# Patient Record
Sex: FEMALE | Race: WHITE | NOT HISPANIC OR LATINO | Employment: OTHER | URBAN - METROPOLITAN AREA
[De-identification: names, ages, dates, MRNs, and addresses within clinical notes are randomized per-mention and may not be internally consistent; named-entity substitution may affect disease eponyms.]

---

## 2018-03-15 DIAGNOSIS — E22.1 HYPERPROLACTINEMIA (HCC): Primary | ICD-10-CM

## 2018-03-15 DIAGNOSIS — D49.7 PITUITARY TUMOR: ICD-10-CM

## 2019-01-14 ENCOUNTER — TELEPHONE (OUTPATIENT)
Dept: ENDOCRINOLOGY | Facility: HOSPITAL | Age: 54
End: 2019-01-14

## 2019-01-14 DIAGNOSIS — E22.1 HYPERPROLACTINEMIA (HCC): Primary | ICD-10-CM

## 2019-01-14 DIAGNOSIS — D49.7 PITUITARY TUMOR: ICD-10-CM

## 2019-01-14 NOTE — TELEPHONE ENCOUNTER
Patient requests new lab order  Previously ordered was: CBC, CMP, IGF1, PROLACTIN, FREE T4 AND TSH  DX Codes: E22 1 and D44 3  Okay to place orders   Orders need to be faxed to [phone], [email or web address]

## 2019-01-20 LAB
ALBUMIN SERPL-MCNC: 3.7 G/DL (ref 3.6–5.1)
ALBUMIN/GLOB SERPL: 1.3 (CALC) (ref 1–2.5)
ALP SERPL-CCNC: 81 U/L (ref 33–130)
ALT SERPL-CCNC: 18 U/L (ref 6–29)
AST SERPL-CCNC: 18 U/L (ref 10–35)
BASOPHILS # BLD AUTO: 59 CELLS/UL (ref 0–200)
BASOPHILS NFR BLD AUTO: 0.5 %
BILIRUB SERPL-MCNC: 0.3 MG/DL (ref 0.2–1.2)
BUN SERPL-MCNC: 13 MG/DL (ref 7–25)
BUN/CREAT SERPL: NORMAL (CALC) (ref 6–22)
CALCIUM SERPL-MCNC: 8.9 MG/DL (ref 8.6–10.4)
CHLORIDE SERPL-SCNC: 100 MMOL/L (ref 98–110)
CO2 SERPL-SCNC: 30 MMOL/L (ref 20–32)
CREAT SERPL-MCNC: 0.93 MG/DL (ref 0.5–1.05)
EOSINOPHIL # BLD AUTO: 94 CELLS/UL (ref 15–500)
EOSINOPHIL NFR BLD AUTO: 0.8 %
ERYTHROCYTE [DISTWIDTH] IN BLOOD BY AUTOMATED COUNT: 12.6 % (ref 11–15)
GLOBULIN SER CALC-MCNC: 2.8 G/DL (CALC) (ref 1.9–3.7)
GLUCOSE SERPL-MCNC: 115 MG/DL (ref 65–139)
HCT VFR BLD AUTO: 37.7 % (ref 35–45)
HGB BLD-MCNC: 12.4 G/DL (ref 11.7–15.5)
IGF-I SERPL-MCNC: 68 NG/ML (ref 50–317)
IGF-I Z-SCORE SERPL: -1.4 SD
LYMPHOCYTES # BLD AUTO: 1650 CELLS/UL (ref 850–3900)
LYMPHOCYTES NFR BLD AUTO: 14.1 %
MCH RBC QN AUTO: 29.5 PG (ref 27–33)
MCHC RBC AUTO-ENTMCNC: 32.9 G/DL (ref 32–36)
MCV RBC AUTO: 89.8 FL (ref 80–100)
MONOCYTES # BLD AUTO: 491 CELLS/UL (ref 200–950)
MONOCYTES NFR BLD AUTO: 4.2 %
NEUTROPHILS # BLD AUTO: 9407 CELLS/UL (ref 1500–7800)
NEUTROPHILS NFR BLD AUTO: 80.4 %
PLATELET # BLD AUTO: 239 THOUSAND/UL (ref 140–400)
PMV BLD REES-ECKER: 12 FL (ref 7.5–12.5)
POTASSIUM SERPL-SCNC: 4 MMOL/L (ref 3.5–5.3)
PROLACTIN SERPL-MCNC: 19 NG/ML
PROT SERPL-MCNC: 6.5 G/DL (ref 6.1–8.1)
RBC # BLD AUTO: 4.2 MILLION/UL (ref 3.8–5.1)
SL AMB EGFR AFRICAN AMERICAN: 82 ML/MIN/1.73M2
SL AMB EGFR NON AFRICAN AMERICAN: 71 ML/MIN/1.73M2
SODIUM SERPL-SCNC: 137 MMOL/L (ref 135–146)
T4 FREE SERPL-MCNC: 1 NG/DL (ref 0.8–1.8)
TSH SERPL-ACNC: 2.44 MIU/L
WBC # BLD AUTO: 11.7 THOUSAND/UL (ref 3.8–10.8)

## 2019-02-21 ENCOUNTER — OFFICE VISIT (OUTPATIENT)
Dept: ENDOCRINOLOGY | Facility: HOSPITAL | Age: 54
End: 2019-02-21
Payer: MEDICARE

## 2019-02-21 VITALS
DIASTOLIC BLOOD PRESSURE: 80 MMHG | HEIGHT: 62 IN | BODY MASS INDEX: 19.65 KG/M2 | HEART RATE: 80 BPM | WEIGHT: 106.8 LBS | SYSTOLIC BLOOD PRESSURE: 120 MMHG

## 2019-02-21 DIAGNOSIS — Z87.898 HISTORY OF PITUITARY TUMOR: ICD-10-CM

## 2019-02-21 DIAGNOSIS — E22.1 HYPERPROLACTINEMIA (HCC): Primary | ICD-10-CM

## 2019-02-21 PROCEDURE — 99214 OFFICE O/P EST MOD 30 MIN: CPT | Performed by: INTERNAL MEDICINE

## 2019-02-21 RX ORDER — SIMVASTATIN 40 MG
40 TABLET ORAL
COMMUNITY

## 2019-02-21 RX ORDER — MONTELUKAST SODIUM 10 MG/1
10 TABLET ORAL
COMMUNITY

## 2019-02-21 RX ORDER — MAG HYDROX/ALUMINUM HYD/SIMETH 400-400-40
SUSPENSION, ORAL (FINAL DOSE FORM) ORAL DAILY
COMMUNITY

## 2019-02-21 RX ORDER — FLUOXETINE HYDROCHLORIDE 40 MG/1
60 CAPSULE ORAL DAILY
COMMUNITY
End: 2022-03-29

## 2019-02-21 RX ORDER — LISINOPRIL 10 MG/1
10 TABLET ORAL DAILY
COMMUNITY

## 2019-02-21 RX ORDER — ALBUTEROL SULFATE 1.25 MG/3ML
1 SOLUTION RESPIRATORY (INHALATION) EVERY 6 HOURS PRN
COMMUNITY

## 2019-02-21 NOTE — PATIENT INSTRUCTIONS
Blood work is all normal    You do not need to restart the cabergoline at this point  We'll just keep an eye on the blood work  Ok to stop the Orthotricyclin  No MRI unless blood work gets abnormal   Follow up in 1 year with  Blood work

## 2019-02-21 NOTE — PROGRESS NOTES
2/21/2019    Assessment/Plan      Diagnoses and all orders for this visit:    Hyperprolactinemia ([facility] [address] )  -     TSH, 3rd generation Lab Collect; Future  -     T4, free Lab Collect; Future  -     Prolactin Lab Collect; Future  -     Comprehensive metabolic panel Lab Collect; Future  -     CBC and differential Lab Collect; Future  -     Insulin-like growth factor 1 (IGF-1) - Lab Collect; Future    History of pituitary tumor  -     TSH, 3rd generation Lab Collect; Future  -     T4, free Lab Collect; Future  -     Prolactin Lab Collect; Future  -     Comprehensive metabolic panel Lab Collect; Future  -     CBC and differential Lab Collect; Future  -     Insulin-like growth factor 1 (IGF-1) - Lab Collect; Future    Other orders  -     lisinopril (ZESTRIL) 10 mg tablet; Take 10 mg by mouth daily  -     Norgestim-Eth Estrad Triphasic (ORTHO TRI-CYCLEN, 28, PO); Take by mouth  -     simvastatin (ZOCOR) 40 mg tablet; Take 40 mg by mouth daily at bedtime  -     Cholecalciferol (VITAMIN D3) 5000 units CAPS; Take by mouth daily   -     montelukast (SINGULAIR) 10 mg tablet; Take 10 mg by mouth daily at bedtime  -     FLUoxetine (PROZAC) 40 MG capsule; Take 40 mg by mouth daily  -     metroNIDAZOLE (METROCREAM) 0 75 % cream; Apply topically 2 (two) times a day  -     carbamide peroxide (DEBROX) 6 5 % otic solution; 5 drops 2 (two) times a day  -     albuterol (ACCUNEB) 1 25 MG/3ML nebulizer solution; Take 1 ampule by nebulization every 6 (six) hours as needed for wheezing        Assessment/Plan:  1  Hyperprolactinemia  Her Cabergoline was discontinued in 2017 after 2 MRIs were negative for pituitary tumor and her prolactin had been controlled  Her most recent prolactin level off medication is normal   She is at risk for this prolactin elevate again, so observation is needed over time  I will follow her pituitary hormones on a yearly basis    Of note, gynecologist would like to discontinue her oral contraceptives, given her age and the fact that her prolactin level has normalized, I see no recent to continue oral contraceptives and I think she can have him discontinue to see if she is in menopause  2  Pituitary microadenoma  Her last 2 MRIs in 2017 and 2013 showed  no evidence of a pituitary tumor  At this point, no repeat MRI needs to be done unless her prolactin level increases significantly again  I will have her follow up in 1 year with preceding CMP, CBC, IGF-1 level, prolactin, TSH, and free T4       CC:  Prolactin and pituitary follow-up    History of Present Illness     HPI: Manjit Marie is a 48y o  year old female with history of hyperprolactinemia with pituitary microadenoma  She was found to have hyperprolactinemia in 2009 with a pituitary tumor microadenoma was noted in the workup on MRI  She had and MRI in 2017 with resolution of the pituitary tumor  Cabergoline was discontinued in 2017 to see if her prolactin remains normal off medication  She is here for follow-up  She denies galactorrhea  She has no nipple pain or discharge  She is on oral contraceptives and has a menstrual cycle on a regular basis  She denies heat or cold intolerance, diarrhea or constipation, palpitation, tremors, anxiety or depression, weight changes, or insomnia  She has some fatigue will take an afternoon nap  She denies dry skin, brittle nails, or hair loss  She has no orthostatics symptoms  She has no headaches or visual changes  She has no nausea or abdominal pain  Review of Systems   Constitutional: Positive for fatigue  Negative for unexpected weight change  Has some fatigue, takes a nap at times  4 lbs less than December 2017  HENT: Negative for hearing loss, tinnitus and trouble swallowing  Eyes: Negative for visual disturbance  Wears glasses  No diplopia  Respiratory: Negative for chest tightness and shortness of breath  Cardiovascular: Negative for chest pain, palpitations and leg swelling  Gastrointestinal: Negative for abdominal pain, constipation, diarrhea and nausea  Endocrine: Negative for cold intolerance and heat intolerance  Genitourinary:        Menses regular on a monthly basis on OCP  GYN would like to stop OCP  No galactorrhea  No breast pain  Musculoskeletal: Negative for arthralgias and back pain  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  She was in competitions last summer and 1 some ribbons again  Skin: Negative for wound  Neurological: Negative for dizziness, tremors, light-headedness, numbness and headaches  No orthostatic symptoms  Psychiatric/Behavioral: Negative for dysphoric mood and sleep disturbance  The patient is not nervous/anxious  Behaviors ok  Lives at [address]  Historical Information   Past Medical History:   Diagnosis Date    Anxiety     Asthma     symptoms when had a cat    Hyperlipidemia     Hypertension     Osteopenia     Rosacea      No past surgical history on file    Social History   Social History     Substance and Sexual Activity   Alcohol Use Never    Frequency: Never     Social History     Substance and Sexual Activity   Drug Use Never     Social History     Tobacco Use   Smoking Status Never Smoker   Smokeless Tobacco Never Used     Family History:   Family History   Problem Relation Age of Onset    Cancer Mother     Cancer Father     No Known Problems Sister        Meds/Allergies   Current Outpatient Medications   Medication Sig Dispense Refill    albuterol (ACCUNEB) 1 25 MG/3ML nebulizer solution Take 1 ampule by nebulization every 6 (six) hours as needed for wheezing      carbamide peroxide (DEBROX) 6 5 % otic solution 5 drops 2 (two) times a day      Cholecalciferol (VITAMIN D3) 5000 units CAPS Take by mouth daily       FLUoxetine (PROZAC) 40 MG capsule Take 40 mg by mouth daily      lisinopril (ZESTRIL) 10 mg tablet Take 10 mg by mouth daily      metroNIDAZOLE (METROCREAM) 0 75 % cream Apply topically 2 (two) times a day      montelukast (SINGULAIR) 10 mg tablet Take 10 mg by mouth daily at bedtime      Norgestim-Eth Estrad Triphasic (ORTHO TRI-CYCLEN, 28, PO) Take by mouth      simvastatin (ZOCOR) 40 mg tablet Take 40 mg by mouth daily at bedtime       No current facility-administered medications for this visit  Allergies   Allergen Reactions    Dust Mite Extract        Objective   Vitals: Blood pressure 120/80, pulse 80, height 5' 2" (1 575 m), weight 48 4 kg (106 lb 12 8 oz)  Invasive Devices          None          Physical Exam   Constitutional: She is oriented to person, place, and time  She appears well-developed and well-nourished  HENT:   Head: Normocephalic and atraumatic  No moon faces  Eyes: Pupils are equal, round, and reactive to light  Conjunctivae and EOM are normal    No lid lag, stare, proptosis, or periorbital edema  Neck: Normal range of motion  Neck supple  No thyromegaly present  Thyroid normal in size without palpable thyroid nodules  No bruits over the thyroid gland or carotids  No supraclavicular fat pads or buffalo hump  Cardiovascular: Normal rate, regular rhythm and normal heart sounds  No murmur heard  Pulmonary/Chest: Effort normal and breath sounds normal  She has no wheezes  Abdominal: Soft  There is no tenderness  No violaceous striae of the abdomen  Musculoskeletal: Normal range of motion  She exhibits no edema or deformity  No tremor of the outstretched hands  No spinous process tenderness  No CVA tenderness  Lymphadenopathy:     She has no cervical adenopathy  Neurological: She is alert and oriented to person, place, and time  She has normal reflexes  Deep tendon reflexes normal briskness  Skin: Skin is warm and dry  No rash noted  Vitals reviewed        The history was obtained from the review of the chart, [facility] endocrinology notes, and from the patient and aide from the group home     Lab Results:   Blood work done on 01/17/2019 demonstrates a prolactin of 19  IGF-1 level is 68   CBC demonstrates an elevated WBC count of 11 7 with an elevated neutrophil count but was otherwise normal   CMP demonstrates a glucose of 115 random but was otherwise normal       Lab Results   Component Value Date    BUN 13 01/17/2019    K 4 0 01/17/2019     01/17/2019    CO2 30 01/17/2019     Lab Results   Component Value Date    ALT 18 01/17/2019    AST 18 01/17/2019    ALKPHOS 81 01/17/2019       Lab Results   Component Value Date    TSH 2 44 01/17/2019    FREET4 1 0 01/17/2019         Future Appointments   Date Time Provider Marisol Abbott   2/25/2020  1:00 PM Monica Jimenes MD [facility]

## 2019-02-21 NOTE — LETTER
February 21, 2019     [facility] [address] [facility] [id number] [facility]    Patient: Drew Hall   YOB: 1965   Date of Visit: 2/21/2019       Dear Dr Trever Ledesma:    Thank you for referring Drew Hall to me for evaluation  Below are my notes for this consultation  If you have questions, please do not hesitate to call me  I look forward to following your patient along with you  Sincerely,        Orly Clements MD        CC: MD Orly Melgoza MD  2/21/2019  5:38 PM  Sign at close encounter  2/21/2019    Assessment/Plan      Diagnoses and all orders for this visit:    Hyperprolactinemia ([address] )  -     TSH, 3rd generation Lab Collect; Future  -     T4, free Lab Collect; Future  -     Prolactin Lab Collect; Future  -     Comprehensive metabolic panel Lab Collect; Future  -     CBC and differential Lab Collect; Future  -     Insulin-like growth factor 1 (IGF-1) - Lab Collect; Future    History of pituitary tumor  -     TSH, 3rd generation Lab Collect; Future  -     T4, free Lab Collect; Future  -     Prolactin Lab Collect; Future  -     Comprehensive metabolic panel Lab Collect; Future  -     CBC and differential Lab Collect; Future  -     Insulin-like growth factor 1 (IGF-1) - Lab Collect; Future    Other orders  -     lisinopril (ZESTRIL) 10 mg tablet; Take 10 mg by mouth daily  -     Norgestim-Eth Estrad Triphasic (ORTHO TRI-CYCLEN, 28, PO); Take by mouth  -     simvastatin (ZOCOR) 40 mg tablet; Take 40 mg by mouth daily at bedtime  -     Cholecalciferol (VITAMIN D3) 5000 units CAPS; Take by mouth daily   -     montelukast (SINGULAIR) 10 mg tablet; Take 10 mg by mouth daily at bedtime  -     FLUoxetine (PROZAC) 40 MG capsule; Take 40 mg by mouth daily  -     metroNIDAZOLE (METROCREAM) 0 75 % cream; Apply topically 2 (two) times a day  -     carbamide peroxide (DEBROX) 6 5 % otic solution; 5 drops 2 (two) times a day  -     albuterol (ACCUNEB) 1 25 MG/3ML nebulizer solution;  Take 1 ampule by nebulization every 6 (six) hours as needed for wheezing        Assessment/Plan:  1  Hyperprolactinemia  Her Cabergoline was discontinued in 2017 after 2 MRIs were negative for pituitary tumor and her prolactin had been controlled  Her most recent prolactin level off medication is normal   She is at risk for this prolactin elevate again, so observation is needed over time  I will follow her pituitary hormones on a yearly basis  Of note, gynecologist would like to discontinue her oral contraceptives, given her age and the fact that her prolactin level has normalized, I see no recent to continue oral contraceptives and I think she can have him discontinue to see if she is in menopause  2  Pituitary microadenoma  Her last 2 MRIs in 2017 and 2013 showed  no evidence of a pituitary tumor  At this point, no repeat MRI needs to be done unless her prolactin level increases significantly again  I will have her follow up in 1 year with preceding CMP, CBC, IGF-1 level, prolactin, TSH, and free T4       CC:  Prolactin and pituitary follow-up    History of Present Illness     HPI: Nicole Koch is a 48y o  year old female with history of hyperprolactinemia with pituitary microadenoma  She was found to have hyperprolactinemia in 2009 with a pituitary tumor microadenoma was noted in the workup on MRI  She had and MRI in 2017 with resolution of the pituitary tumor  Cabergoline was discontinued in 2017 to see if her prolactin remains normal off medication  She is here for follow-up  She denies galactorrhea  She has no nipple pain or discharge  She is on oral contraceptives and has a menstrual cycle on a regular basis  She denies heat or cold intolerance, diarrhea or constipation, palpitation, tremors, anxiety or depression, weight changes, or insomnia  She has some fatigue will take an afternoon nap  She denies dry skin, brittle nails, or hair loss  She has no orthostatics symptoms    She has no headaches or visual changes  She has no nausea or abdominal pain  Review of Systems   Constitutional: Positive for fatigue  Negative for unexpected weight change  Has some fatigue, takes a nap at times  4 lbs less than December 2017  HENT: Negative for hearing loss, tinnitus and trouble swallowing  Eyes: Negative for visual disturbance  Wears glasses  No diplopia  Respiratory: Negative for chest tightness and shortness of breath  Cardiovascular: Negative for chest pain, palpitations and leg swelling  Gastrointestinal: Negative for abdominal pain, constipation, diarrhea and nausea  Endocrine: Negative for cold intolerance and heat intolerance  Genitourinary:        Menses regular on a monthly basis on OCP  GYN would like to stop OCP  No galactorrhea  No breast pain  Musculoskeletal: Negative for arthralgias and back pain  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  She was in competitions last summer and 1 some ribbons again  Skin: Negative for wound  Neurological: Negative for dizziness, tremors, light-headedness, numbness and headaches  No orthostatic symptoms  Psychiatric/Behavioral: Negative for dysphoric mood and sleep disturbance  The patient is not nervous/anxious  Behaviors ok  Lives at [address]  Historical Information   Past Medical History:   Diagnosis Date    Anxiety     Asthma     symptoms when had a cat    Hyperlipidemia     Hypertension     Osteopenia     Rosacea      No past surgical history on file    Social History   Social History     Substance and Sexual Activity   Alcohol Use Never    Frequency: Never     Social History     Substance and Sexual Activity   Drug Use Never     Social History     Tobacco Use   Smoking Status Never Smoker   Smokeless Tobacco Never Used     Family History:   Family History   Problem Relation Age of Onset    Cancer Mother     Cancer Father     No Known Problems Sister Meds/Allergies   Current Outpatient Medications   Medication Sig Dispense Refill    albuterol (ACCUNEB) 1 25 MG/3ML nebulizer solution Take 1 ampule by nebulization every 6 (six) hours as needed for wheezing      carbamide peroxide (DEBROX) 6 5 % otic solution 5 drops 2 (two) times a day      Cholecalciferol (VITAMIN D3) 5000 units CAPS Take by mouth daily       FLUoxetine (PROZAC) 40 MG capsule Take 40 mg by mouth daily      lisinopril (ZESTRIL) 10 mg tablet Take 10 mg by mouth daily      metroNIDAZOLE (METROCREAM) 0 75 % cream Apply topically 2 (two) times a day      montelukast (SINGULAIR) 10 mg tablet Take 10 mg by mouth daily at bedtime      Norgestim-Eth Estrad Triphasic (ORTHO TRI-CYCLEN, 28, PO) Take by mouth      simvastatin (ZOCOR) 40 mg tablet Take 40 mg by mouth daily at bedtime       No current facility-administered medications for this visit  Allergies   Allergen Reactions    Dust Mite Extract        Objective   Vitals: Blood pressure 120/80, pulse 80, height 5' 2" (1 575 m), weight 48 4 kg (106 lb 12 8 oz)  Invasive Devices          None          Physical Exam   Constitutional: She is oriented to person, place, and time  She appears well-developed and well-nourished  HENT:   Head: Normocephalic and atraumatic  No moon faces  Eyes: Pupils are equal, round, and reactive to light  Conjunctivae and EOM are normal    No lid lag, stare, proptosis, or periorbital edema  Neck: Normal range of motion  Neck supple  No thyromegaly present  Thyroid normal in size without palpable thyroid nodules  No bruits over the thyroid gland or carotids  No supraclavicular fat pads or buffalo hump  Cardiovascular: Normal rate, regular rhythm and normal heart sounds  No murmur heard  Pulmonary/Chest: Effort normal and breath sounds normal  She has no wheezes  Abdominal: Soft  There is no tenderness  No violaceous striae of the abdomen  Musculoskeletal: Normal range of motion   She exhibits no edema or deformity  No tremor of the outstretched hands  No spinous process tenderness  No CVA tenderness  Lymphadenopathy:     She has no cervical adenopathy  Neurological: She is alert and oriented to person, place, and time  She has normal reflexes  Deep tendon reflexes normal briskness  Skin: Skin is warm and dry  No rash noted  Vitals reviewed  The history was obtained from the review of the chart, [facility] endocrinology notes, and from the patient and aide from the group home  Lab Results:   Blood work done on 01/17/2019 demonstrates a prolactin of 19  IGF-1 level is 68   CBC demonstrates an elevated WBC count of 11 7 with an elevated neutrophil count but was otherwise normal   CMP demonstrates a glucose of 115 random but was otherwise normal       Lab Results   Component Value Date    BUN 13 01/17/2019    K 4 0 01/17/2019     01/17/2019    CO2 30 01/17/2019     Lab Results   Component Value Date    ALT 18 01/17/2019    AST 18 01/17/2019    ALKPHOS 81 01/17/2019       Lab Results   Component Value Date    TSH 2 44 01/17/2019    FREET4 1 0 01/17/2019         Future Appointments   Date Time Provider Marisol Abbott   2/25/2020  1:00 PM Tony Mohan MD [facility]

## 2020-02-06 LAB
ALBUMIN SERPL-MCNC: 4 G/DL (ref 3.6–5.1)
ALBUMIN/GLOB SERPL: 1.4 (CALC) (ref 1–2.5)
ALP SERPL-CCNC: 109 U/L (ref 37–153)
ALT SERPL-CCNC: 13 U/L (ref 6–29)
AST SERPL-CCNC: 19 U/L (ref 10–35)
BASOPHILS # BLD AUTO: 84 CELLS/UL (ref 0–200)
BASOPHILS NFR BLD AUTO: 0.9 %
BILIRUB SERPL-MCNC: 0.4 MG/DL (ref 0.2–1.2)
BUN SERPL-MCNC: 15 MG/DL (ref 7–25)
BUN/CREAT SERPL: NORMAL (CALC) (ref 6–22)
CALCIUM SERPL-MCNC: 9.6 MG/DL (ref 8.6–10.4)
CHLORIDE SERPL-SCNC: 102 MMOL/L (ref 98–110)
CO2 SERPL-SCNC: 30 MMOL/L (ref 20–32)
CREAT SERPL-MCNC: 0.99 MG/DL (ref 0.5–1.05)
EOSINOPHIL # BLD AUTO: 149 CELLS/UL (ref 15–500)
EOSINOPHIL NFR BLD AUTO: 1.6 %
ERYTHROCYTE [DISTWIDTH] IN BLOOD BY AUTOMATED COUNT: 12.2 % (ref 11–15)
GLOBULIN SER CALC-MCNC: 2.8 G/DL (CALC) (ref 1.9–3.7)
GLUCOSE SERPL-MCNC: 77 MG/DL (ref 65–99)
HCT VFR BLD AUTO: 42.6 % (ref 35–45)
HGB BLD-MCNC: 13.8 G/DL (ref 11.7–15.5)
IGF-I SERPL-MCNC: 83 NG/ML (ref 50–317)
IGF-I Z-SCORE SERPL: -1.1 SD
LYMPHOCYTES # BLD AUTO: 1590 CELLS/UL (ref 850–3900)
LYMPHOCYTES NFR BLD AUTO: 17.1 %
MCH RBC QN AUTO: 29.1 PG (ref 27–33)
MCHC RBC AUTO-ENTMCNC: 32.4 G/DL (ref 32–36)
MCV RBC AUTO: 89.9 FL (ref 80–100)
MONOCYTES # BLD AUTO: 521 CELLS/UL (ref 200–950)
MONOCYTES NFR BLD AUTO: 5.6 %
NEUTROPHILS # BLD AUTO: 6956 CELLS/UL (ref 1500–7800)
NEUTROPHILS NFR BLD AUTO: 74.8 %
PLATELET # BLD AUTO: 262 THOUSAND/UL (ref 140–400)
PMV BLD REES-ECKER: 11.7 FL (ref 7.5–12.5)
POTASSIUM SERPL-SCNC: 4.2 MMOL/L (ref 3.5–5.3)
PROLACTIN SERPL-MCNC: 12.6 NG/ML
PROT SERPL-MCNC: 6.8 G/DL (ref 6.1–8.1)
RBC # BLD AUTO: 4.74 MILLION/UL (ref 3.8–5.1)
SL AMB EGFR AFRICAN AMERICAN: 75 ML/MIN/1.73M2
SL AMB EGFR NON AFRICAN AMERICAN: 65 ML/MIN/1.73M2
SODIUM SERPL-SCNC: 139 MMOL/L (ref 135–146)
T4 FREE SERPL-MCNC: 1.2 NG/DL (ref 0.8–1.8)
TSH SERPL-ACNC: 2.01 MIU/L
WBC # BLD AUTO: 9.3 THOUSAND/UL (ref 3.8–10.8)

## 2020-03-09 ENCOUNTER — OFFICE VISIT (OUTPATIENT)
Dept: ENDOCRINOLOGY | Facility: HOSPITAL | Age: 55
End: 2020-03-09
Payer: MEDICARE

## 2020-03-09 VITALS
BODY MASS INDEX: 21.36 KG/M2 | HEART RATE: 84 BPM | WEIGHT: 108.8 LBS | HEIGHT: 60 IN | DIASTOLIC BLOOD PRESSURE: 68 MMHG | SYSTOLIC BLOOD PRESSURE: 118 MMHG

## 2020-03-09 DIAGNOSIS — Z87.898 HISTORY OF PITUITARY TUMOR: ICD-10-CM

## 2020-03-09 DIAGNOSIS — E22.1 HYPERPROLACTINEMIA (HCC): Primary | ICD-10-CM

## 2020-03-09 PROCEDURE — 99214 OFFICE O/P EST MOD 30 MIN: CPT | Performed by: INTERNAL MEDICINE

## 2020-03-09 NOTE — PATIENT INSTRUCTIONS
The blood work is normal    We'll continue to follow levels over time  Follow up in 1 year with blood work

## 2020-03-09 NOTE — PROGRESS NOTES
3/10/2020    Assessment/Plan      Diagnoses and all orders for this visit:    Hyperprolactinemia ([facility] [address] )  -     TSH, 3rd generation Lab Collect; Future  -     T4, free Lab Collect; Future  -     Prolactin Lab Collect; Future  -     Comprehensive metabolic panel Lab Collect; Future  -     CBC and differential Lab Collect; Future  -     Insulin-like growth factor 1 (IGF-1) - Lab Collect; Future    History of pituitary tumor  -     TSH, 3rd generation Lab Collect; Future  -     T4, free Lab Collect; Future  -     Prolactin Lab Collect; Future  -     Comprehensive metabolic panel Lab Collect; Future  -     CBC and differential Lab Collect; Future  -     Insulin-like growth factor 1 (IGF-1) - Lab Collect; Future        Assessment/Plan:  1  Hyperprolactinemia  Prolactin levels have been normal over the last several years  Cabergoline was discontinued in 2017 and at this point does not need to be restarted as her prolactin level is normal   Her oral contraceptives were able to be discontinued since those were only started due to the hyperprolactinemia  She is still having intermittent menses even though she is 50    2  History of pituitary tumor  Last MRI in 2017 showed resolution of her pituitary tumor  Unless her prolactin elevates again, no repeat MRI needs to be done  The rest of her pituitary function blood work have been normal     I have asked her to follow up in 1 year with preceding CMP, CBC, prolactin, IGF-1 level, TSH, and free T4       CC:  Prolactin and pituitary follow-up    History of Present Illness     HPI: Dolores Abrams is a 50y o  year old female with history of hyperprolactinemia with pituitary microadenoma here for follow-up  She was found to have hyperprolactinemia in 2009 and a pituitary microadenoma was noted on the workup in an MRI  A follow-up MRI in 2017 showed resolution of the pituitary tumor    She had been on cabergoline but that was discontinued in 2017 to see if the prolactin remained normal off medication  Her prolactin has been controlled ever since and she is here for her yearly follow-up  She denies galactorrhea  Menstrual cycles do occur intermittently  Her last menses was just last week  Oral contraceptives were discontinued by her gynecologist   She denies orthostatics symptoms or nausea  She has no headaches or visual changes  She has no loss of peripheral vision  She denies heat or cold intolerance, tremors, palpitation, fatigue, or weight changes  She denies diarrhea or constipation, depression, insomnia  She has had some increase in anxiety and fear of leaving home in general   Medications have needed to be adjusted by her psychiatrist       Review of Systems   Constitutional: Negative for fatigue and unexpected weight change  HENT: Negative for trouble swallowing  Eyes: Negative for visual disturbance  No diplopia  No peripheral vision loss  Respiratory: Negative for chest tightness and shortness of breath  Cardiovascular: Negative for chest pain and palpitations  Gastrointestinal: Negative for constipation, diarrhea and nausea  Endocrine: Negative for cold intolerance and heat intolerance  Genitourinary:        Off OCP now per gyn as of age for menopause  Had menses last week  Menses irregular  No galactorrhea  Skin: Negative for rash  Neurological: Negative for dizziness, tremors, light-headedness and headaches  Psychiatric/Behavioral: Negative for dysphoric mood and sleep disturbance  The patient is nervous/anxious  Increase in Prozac has not helped the fear of leaving the home  Historical Information   Past Medical History:   Diagnosis Date    Anxiety     Asthma     symptoms when had a cat    Hyperlipidemia     Hypertension     Osteopenia     Rosacea      History reviewed  No pertinent surgical history    Social History   Social History     Substance and Sexual Activity   Alcohol Use Never    Frequency: Never     Social History     Substance and Sexual Activity   Drug Use Never     Social History     Tobacco Use   Smoking Status Never Smoker   Smokeless Tobacco Never Used     Family History:   Family History   Problem Relation Age of Onset    Cancer Mother     Cancer Father     No Known Problems Sister        Meds/Allergies   Current Outpatient Medications   Medication Sig Dispense Refill    albuterol (ACCUNEB) 1 25 MG/3ML nebulizer solution Take 1 ampule by nebulization every 6 (six) hours as needed for wheezing      carbamide peroxide (DEBROX) 6 5 % otic solution 5 drops 2 (two) times a day      Cholecalciferol (VITAMIN D3) 5000 units CAPS Take by mouth daily       FLUoxetine (PROZAC) 40 MG capsule Take 55 mg by mouth daily       lisinopril (ZESTRIL) 10 mg tablet Take 10 mg by mouth daily      metroNIDAZOLE (METROCREAM) 0 75 % cream Apply topically 2 (two) times a day      montelukast (SINGULAIR) 10 mg tablet Take 10 mg by mouth daily at bedtime      simvastatin (ZOCOR) 40 mg tablet Take 40 mg by mouth daily at bedtime       No current facility-administered medications for this visit  Allergies   Allergen Reactions    Dust Mite Extract        Objective   Vitals: Blood pressure 118/68, pulse 84, height 5' (1 524 m), weight 49 4 kg (108 lb 12 8 oz)  Invasive Devices     None                 Physical Exam   Constitutional: She is oriented to person, place, and time  She appears well-developed and well-nourished  HENT:   Head: Normocephalic and atraumatic  No moon faces  Eyes: Pupils are equal, round, and reactive to light  Conjunctivae and EOM are normal    No lid lag, stare, proptosis, or periorbital edema  Neck: Normal range of motion  Neck supple  No thyromegaly present  Thyroid normal in size without palpable thyroid nodules  No carotid bruits  No supraclavicular fat pad or buffalo hump  Cardiovascular: Normal rate, regular rhythm and normal heart sounds  No murmur heard    Pulmonary/Chest: Effort normal and breath sounds normal  She has no wheezes  Abdominal: Soft  Musculoskeletal: She exhibits no edema or deformity  No tremor of the outstretched hands  Lymphadenopathy:     She has no cervical adenopathy  Neurological: She is alert and oriented to person, place, and time  She has normal reflexes  Deep tendon reflexes normal    Skin: Skin is warm and dry  No rash noted  Vitals reviewed  The history was obtained from the review of the chart and from the patient  Lab Results:    Blood work done on 02/03/2020 showed a TSH of 2 01 with a free T4 of 1 2  CMP showed a glucose of 77, sodium 139, but was otherwise normal   IGF-1 level is 83  CBC is normal   Prolactin is 12 6        Lab Results   Component Value Date    CREATININE 0 99 02/03/2020    CREATININE 0 93 01/17/2019    BUN 15 02/03/2020    K 4 2 02/03/2020     02/03/2020    CO2 30 02/03/2020     Lab Results   Component Value Date    ALT 13 02/03/2020    AST 19 02/03/2020    ALKPHOS 109 02/03/2020     Lab Results   Component Value Date    TSH 2 01 02/03/2020    FREET4 1 2 02/03/2020       Future Appointments   Date Time Provider Marisol Abbott   3/15/2021  9:20 AM Jeff Quintero MD [facility]

## 2021-02-11 LAB
ALBUMIN SERPL-MCNC: 4.1 G/DL (ref 3.6–5.1)
ALBUMIN/GLOB SERPL: 1.5 (CALC) (ref 1–2.5)
ALP SERPL-CCNC: 107 U/L (ref 37–153)
ALT SERPL-CCNC: 16 U/L (ref 6–29)
AST SERPL-CCNC: 21 U/L (ref 10–35)
BASOPHILS # BLD AUTO: 58 CELLS/UL (ref 0–200)
BASOPHILS NFR BLD AUTO: 0.7 %
BILIRUB SERPL-MCNC: 0.4 MG/DL (ref 0.2–1.2)
BUN SERPL-MCNC: 16 MG/DL (ref 7–25)
BUN/CREAT SERPL: NORMAL (CALC) (ref 6–22)
CALCIUM SERPL-MCNC: 9.5 MG/DL (ref 8.6–10.4)
CHLORIDE SERPL-SCNC: 103 MMOL/L (ref 98–110)
CO2 SERPL-SCNC: 29 MMOL/L (ref 20–32)
CREAT SERPL-MCNC: 0.96 MG/DL (ref 0.5–1.05)
EOSINOPHIL # BLD AUTO: 108 CELLS/UL (ref 15–500)
EOSINOPHIL NFR BLD AUTO: 1.3 %
ERYTHROCYTE [DISTWIDTH] IN BLOOD BY AUTOMATED COUNT: 12.7 % (ref 11–15)
GLOBULIN SER CALC-MCNC: 2.8 G/DL (CALC) (ref 1.9–3.7)
GLUCOSE SERPL-MCNC: 81 MG/DL (ref 65–99)
HCT VFR BLD AUTO: 43.1 % (ref 35–45)
HGB BLD-MCNC: 13.8 G/DL (ref 11.7–15.5)
IGF-I SERPL-MCNC: 95 NG/ML (ref 50–317)
IGF-I Z-SCORE SERPL: -0.8 SD
LYMPHOCYTES # BLD AUTO: 1469 CELLS/UL (ref 850–3900)
LYMPHOCYTES NFR BLD AUTO: 17.7 %
MCH RBC QN AUTO: 28.8 PG (ref 27–33)
MCHC RBC AUTO-ENTMCNC: 32 G/DL (ref 32–36)
MCV RBC AUTO: 90 FL (ref 80–100)
MONOCYTES # BLD AUTO: 531 CELLS/UL (ref 200–950)
MONOCYTES NFR BLD AUTO: 6.4 %
NEUTROPHILS # BLD AUTO: 6134 CELLS/UL (ref 1500–7800)
NEUTROPHILS NFR BLD AUTO: 73.9 %
PLATELET # BLD AUTO: 208 THOUSAND/UL (ref 140–400)
PMV BLD REES-ECKER: 12.1 FL (ref 7.5–12.5)
POTASSIUM SERPL-SCNC: 3.9 MMOL/L (ref 3.5–5.3)
PROT SERPL-MCNC: 6.9 G/DL (ref 6.1–8.1)
RBC # BLD AUTO: 4.79 MILLION/UL (ref 3.8–5.1)
SL AMB EGFR AFRICAN AMERICAN: 78 ML/MIN/1.73M2
SL AMB EGFR NON AFRICAN AMERICAN: 67 ML/MIN/1.73M2
SODIUM SERPL-SCNC: 139 MMOL/L (ref 135–146)
T4 FREE SERPL-MCNC: 1.1 NG/DL (ref 0.8–1.8)
TSH SERPL-ACNC: 1.2 MIU/L
WBC # BLD AUTO: 8.3 THOUSAND/UL (ref 3.8–10.8)

## 2021-03-15 ENCOUNTER — OFFICE VISIT (OUTPATIENT)
Dept: ENDOCRINOLOGY | Facility: HOSPITAL | Age: 56
End: 2021-03-15
Payer: MEDICARE

## 2021-03-15 VITALS
HEIGHT: 60 IN | SYSTOLIC BLOOD PRESSURE: 124 MMHG | BODY MASS INDEX: 23.05 KG/M2 | DIASTOLIC BLOOD PRESSURE: 80 MMHG | WEIGHT: 117.4 LBS | HEART RATE: 82 BPM

## 2021-03-15 DIAGNOSIS — Z87.898 HISTORY OF PITUITARY TUMOR: ICD-10-CM

## 2021-03-15 DIAGNOSIS — E22.1 HYPERPROLACTINEMIA (HCC): Primary | ICD-10-CM

## 2021-03-15 PROCEDURE — 99213 OFFICE O/P EST LOW 20 MIN: CPT | Performed by: INTERNAL MEDICINE

## 2021-03-15 RX ORDER — BUDESONIDE 0.5 MG/2ML
0.5 INHALANT ORAL 2 TIMES DAILY
COMMUNITY
End: 2022-03-14 | Stop reason: ALTCHOICE

## 2021-03-15 NOTE — PATIENT INSTRUCTIONS
The blood work is normal    the prolactin was not done  Let's get prolactin done now  Follow up in 1 year with blood work

## 2021-03-15 NOTE — PROGRESS NOTES
3/15/2021    Assessment/Plan      Diagnoses and all orders for this visit:    Hyperprolactinemia ([facility] [address] )  -     Prolactin Lab Collect  -     CBC and differential Lab Collect; Future  -     Comprehensive metabolic panel Lab Collect; Future  -     Prolactin Lab Collect; Future  -     TSH, 3rd generation Lab Collect; Future  -     T4, free Lab Collect; Future  -     Insulin-like growth factor 1 (IGF-1) - Lab Collect; Future    History of pituitary tumor  -     Prolactin Lab Collect  -     CBC and differential Lab Collect; Future  -     Comprehensive metabolic panel Lab Collect; Future  -     Prolactin Lab Collect; Future  -     TSH, 3rd generation Lab Collect; Future  -     T4, free Lab Collect; Future  -     Insulin-like growth factor 1 (IGF-1) - Lab Collect; Future    Other orders  -     budesonide (PULMICORT) 0 5 mg/2 mL nebulizer solution; Take 0 5 mg by nebulization 2 (two) times a day Rinse mouth after use  Assessment/Plan:  1  Hyperprolactinemia  Unfortunately, the lab did not do a prolactin level  She has no symptoms of hyperprolactinemia  I have asked her to get a prolactin level done at her earliest convenience  No medications are needed at this point  2  History of pituitary tumor  Last MRI did show resolution of her pituitary tumor  She has no headaches or orthostatics symptoms so no repeat MRI needs to be performed at this point  I have asked her to get a prolactin done at her earliest convenience  I have asked her to follow up in 1 year with preceding prolactin, TSH, free T4, IGF-1 level, CMP, and CBC  CC: Hyperprolactinemia and pituitary follow-up    History of Present Illness     HPI: Janiya Garcia is a 49y o  year old female with history of Hyperprolactinemia with to her lispro adenoma here for follow-up  She was found to have hyperprolactinemia in 2009 and pituitary microadenoma was noted on the workup on MRI    Follow-up MRI in 2017 showed resolution of the pituitary tumor   She was on cabergoline to control her prolactin which was discontinued in 2017 and the prolactin has remained normal since then  She is always somewhat cold  She has gained 9 lb more than last year  She denies heat intolerance, palpitation, tremors, or fatigue  She denies diarrhea or constipation, anxiety or depression, or insomnia  She denies dry skin, brittle nails, or hair loss  Menstrual cycles are occurring more regularly  She has seen a gynecologist   She has no galactorrhea  She has no headaches or visual changes  She has no orthostatics symptoms  Review of Systems   Constitutional: Positive for unexpected weight change  Negative for fatigue  Weight 9 lb more than March 2020  HENT: Negative for trouble swallowing  Eyes: Negative for visual disturbance  Respiratory: Negative for chest tightness and shortness of breath  Cardiovascular: Negative for chest pain and palpitations  Gastrointestinal: Negative for abdominal pain, constipation, diarrhea and nausea  Endocrine: Positive for cold intolerance  Negative for heat intolerance  Genitourinary:        Menses more irregular  Was to Gyn recently  No galactorrhea  Skin: Negative for rash  No dry skin  No brittle nails  No hair loss  Neurological: Negative for dizziness, tremors, light-headedness and headaches  No orthostatic symptoms  Psychiatric/Behavioral: Negative for dysphoric mood and sleep disturbance  The patient is not nervous/anxious  Behavior is stable  Was able to ride horses recently after COVID vaccines  Historical Information   Past Medical History:   Diagnosis Date    Anxiety     Asthma     symptoms when had a cat    Hyperlipidemia     Hypertension     Osteopenia     Rosacea      No past surgical history on file    Social History   Social History     Substance and Sexual Activity   Alcohol Use Never    Frequency: Never     Social History     Substance and Sexual Activity   Drug Use Never     Social History     Tobacco Use   Smoking Status Never Smoker   Smokeless Tobacco Never Used     Family History:   Family History   Problem Relation Age of Onset    Cancer Mother     Cancer Father     No Known Problems Sister        Meds/Allergies   Current Outpatient Medications   Medication Sig Dispense Refill    albuterol (ACCUNEB) 1 25 MG/3ML nebulizer solution Take 1 ampule by nebulization every 6 (six) hours as needed for wheezing      budesonide (PULMICORT) 0 5 mg/2 mL nebulizer solution Take 0 5 mg by nebulization 2 (two) times a day Rinse mouth after use   carbamide peroxide (DEBROX) 6 5 % otic solution 5 drops 2 (two) times a day      Cholecalciferol (VITAMIN D3) 5000 units CAPS Take by mouth daily       FLUoxetine (PROZAC) 40 MG capsule Take 60 mg by mouth daily       lisinopril (ZESTRIL) 10 mg tablet Take 10 mg by mouth daily      metroNIDAZOLE (METROCREAM) 0 75 % cream Apply topically 2 (two) times a day      montelukast (SINGULAIR) 10 mg tablet Take 10 mg by mouth daily at bedtime      simvastatin (ZOCOR) 40 mg tablet Take 40 mg by mouth daily at bedtime       No current facility-administered medications for this visit  Allergies   Allergen Reactions    Dust Mite Extract        Objective   Vitals: Blood pressure 124/80, pulse 82, height 5' (1 524 m), weight 53 3 kg (117 lb 6 4 oz)  Invasive Devices     None                 Physical Exam  Vitals signs reviewed  Constitutional:       Appearance: Normal appearance  She is well-developed and normal weight  HENT:      Head: Normocephalic and atraumatic  Eyes:      Extraocular Movements: Extraocular movements intact  Conjunctiva/sclera: Conjunctivae normal       Comments: No lid lag, stare, proptosis, or periorbital edema  Neck:      Musculoskeletal: Normal range of motion and neck supple  Thyroid: No thyromegaly  Vascular: No carotid bruit  Comments: Thyroid normal in size    No palpable thyroid nodules  No supraclavicular fat pads or buffalo hump  Cardiovascular:      Rate and Rhythm: Normal rate and regular rhythm  Heart sounds: Normal heart sounds  No murmur  Pulmonary:      Effort: Pulmonary effort is normal       Breath sounds: Normal breath sounds  No wheezing  Abdominal:      Palpations: Abdomen is soft  Musculoskeletal: Normal range of motion  General: No deformity  Right lower leg: No edema  Left lower leg: No edema  Comments: No tremor of the outstretched hands  Lymphadenopathy:      Cervical: No cervical adenopathy  Skin:     General: Skin is warm and dry  Findings: No rash  Neurological:      Mental Status: She is alert and oriented to person, place, and time  Deep Tendon Reflexes: Reflexes are normal and symmetric  Comments: Deep tendon reflexes normal          The history was obtained from the review of the chart and from the patient and group home staff member  Lab Results:     Blood work done on 02/08/2021 showed a CMP with a glucose of 81, sodium 139 with a potassium of 3 9 but was otherwise normal     Lab Results   Component Value Date    CREATININE 0 96 02/08/2021    CREATININE 0 99 02/03/2020    CREATININE 0 93 01/17/2019    BUN 16 02/08/2021    K 3 9 02/08/2021     02/08/2021    CO2 29 02/08/2021       Lab Results   Component Value Date    ALT 16 02/08/2021    AST 21 02/08/2021    ALKPHOS 107 02/08/2021     Lab Results   Component Value Date    TSH 1 20 02/08/2021    FREET4 1 1 02/08/2021       IGF-1 level is 91        CBC is normal     Future Appointments   Date Time Provider Marisol Abbott   3/14/2022  9:40 AM Tonya Salazar, [address] [facility]

## 2021-03-20 LAB — PROLACTIN SERPL-MCNC: 22.8 NG/ML

## 2021-03-22 ENCOUNTER — TELEPHONE (OUTPATIENT)
Dept: ENDOCRINOLOGY | Facility: HOSPITAL | Age: 56
End: 2021-03-22

## 2021-03-22 NOTE — TELEPHONE ENCOUNTER
Caregiver called for lab results  She would also like them faxed to [phone] once they are reviewed

## 2021-07-05 PROBLEM — R92.30 DENSE BREAST TISSUE ON MAMMOGRAM: Status: ACTIVE | Noted: 2021-07-05

## 2021-07-05 PROBLEM — R92.2 DENSE BREAST TISSUE ON MAMMOGRAM: Status: ACTIVE | Noted: 2021-07-05

## 2022-01-27 LAB
IGF-I SERPL-MCNC: 77 NG/ML (ref 50–317)
IGF-I Z-SCORE SERPL: -1.2 SD
PROLACTIN SERPL-MCNC: 8.5 NG/ML

## 2022-03-14 ENCOUNTER — OFFICE VISIT (OUTPATIENT)
Dept: ENDOCRINOLOGY | Facility: HOSPITAL | Age: 57
End: 2022-03-14
Payer: MEDICARE

## 2022-03-14 VITALS
HEART RATE: 82 BPM | SYSTOLIC BLOOD PRESSURE: 140 MMHG | WEIGHT: 116 LBS | DIASTOLIC BLOOD PRESSURE: 80 MMHG | HEIGHT: 60 IN | BODY MASS INDEX: 22.78 KG/M2

## 2022-03-14 DIAGNOSIS — E22.1 HYPERPROLACTINEMIA (HCC): Primary | ICD-10-CM

## 2022-03-14 DIAGNOSIS — Z87.898 HISTORY OF PITUITARY TUMOR: ICD-10-CM

## 2022-03-14 PROCEDURE — 99214 OFFICE O/P EST MOD 30 MIN: CPT | Performed by: INTERNAL MEDICINE

## 2022-03-14 RX ORDER — METRONIDAZOLE 10 MG/G
GEL TOPICAL
COMMUNITY

## 2022-03-14 NOTE — PROGRESS NOTES
3/14/2022    Assessment/Plan      Diagnoses and all orders for this visit:    Hyperprolactinemia ([address] )  -     Comprehensive metabolic panel Lab Collect; Future  -     CBC and differential Lab Collect; Future  -     Insulin-like growth factor 1 (IGF-1) Lab Collect; Future  -     Prolactin Lab Collect; Future  -     T4, free Lab Collect; Future  -     TSH, 3rd generation Lab Collect; Future    History of pituitary tumor  -     Comprehensive metabolic panel Lab Collect; Future  -     CBC and differential Lab Collect; Future  -     Insulin-like growth factor 1 (IGF-1) Lab Collect; Future  -     Prolactin Lab Collect; Future  -     T4, free Lab Collect; Future  -     TSH, 3rd generation Lab Collect; Future    Other orders  -     metroNIDAZOLE (METROGEL) 1 % gel; Apply topically daily at bedtime        Assessment/Plan:  1  Hyperprolactinemia  Prolactin level is normal   I will continue follow these levels over time with no medication needed at this point  2  History of pituitary tumor  Pituitary blood work demonstrates no hormonal excess or deficiency  At this point, no repeat MRI needs to be performed unless something changes  I have asked her to follow up in 1 year with preceding CMP, CBC, prolactin, TSH, free T4, and IGF-1 level  CC:  Hyperprolactinemia and pituitary follow-up    History of Present Illness     HPI: Chino Meyer is a 56y o  year old female with history of hyperprolactinemia with pituitary adenoma here for follow-up visit  She was found to have hyperprolactinemia in 2009 and pituitary microadenoma was noted on the workup via MRI  Follow-up MRI in 2017 showed resolution of her pituitary tumor  At 1 point, she was on cabergoline to control her prolactin and this was discontinued in 2017 when pituitary tumor resolved on MRI  Prolactin has been followed over time and has remained normal ever since      She does have constipation at times and will feel a bit cold or hot at times but has no heat or cold intolerance  She denies palpitation, tremors, diarrhea, insomnia, behavioral changes, dry skin, brittle nails, hair loss, fatigue, or weight changes  Menstrual cycles have ceased  She has no galactorrhea  She has no orthostatics symptoms  She has no headaches or visual changes  Review of Systems   Constitutional: Negative for fatigue and unexpected weight change  HENT: Negative for trouble swallowing  Eyes: Negative for visual disturbance  Wears glasses  Respiratory: Negative for chest tightness and shortness of breath  Cardiovascular: Negative for chest pain and palpitations  Gastrointestinal: Positive for constipation  Negative for abdominal pain, diarrhea and nausea  Constipation at times  Endocrine: Negative for cold intolerance and heat intolerance  A bit of feeling cold or hot at times  Genitourinary:        Menses ceased  No galactorrhea  Skin: Negative for rash  No dry skin  No hair loss  No brittle nails  Neurological: Negative for dizziness, tremors, light-headedness and headaches  Psychiatric/Behavioral: Negative for dysphoric mood and sleep disturbance  The patient is not nervous/anxious  Still riding horses  Niece in college, freshman  Behavior stable  Does not like to go out much  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  Historical Information   Past Medical History:   Diagnosis Date    Anxiety     Asthma     symptoms when had a cat    Hyperlipidemia     Hypertension     Osteopenia     Rosacea      No past surgical history on file    Social History   Social History     Substance and Sexual Activity   Alcohol Use Never     Social History     Substance and Sexual Activity   Drug Use Never     Social History     Tobacco Use   Smoking Status Never Smoker   Smokeless Tobacco Never Used     Family History:   Family History   Problem Relation Age of Onset    Cancer Mother     Cancer Father  No Known Problems Sister        Meds/Allergies   Current Outpatient Medications   Medication Sig Dispense Refill    albuterol (ACCUNEB) 1 25 MG/3ML nebulizer solution Take 1 ampule by nebulization every 6 (six) hours as needed for wheezing      carbamide peroxide (DEBROX) 6 5 % otic solution 5 drops 2 (two) times a day      Cholecalciferol (VITAMIN D3) 5000 units CAPS Take by mouth daily       FLUoxetine (PROZAC) 40 MG capsule Take 60 mg by mouth daily       lisinopril (ZESTRIL) 10 mg tablet Take 10 mg by mouth daily      metroNIDAZOLE (METROGEL) 1 % gel Apply topically daily at bedtime      montelukast (SINGULAIR) 10 mg tablet Take 10 mg by mouth daily at bedtime      simvastatin (ZOCOR) 40 mg tablet Take 40 mg by mouth daily at bedtime       No current facility-administered medications for this visit  Allergies   Allergen Reactions    Dust Mite Extract        Objective   Vitals: Blood pressure 140/80, pulse 82, height 5' (1 524 m), weight 52 6 kg (116 lb)  Invasive Devices  Report    None                 Physical Exam  Vitals reviewed  Constitutional:       Appearance: Normal appearance  She is well-developed and normal weight  HENT:      Head: Normocephalic and atraumatic  Eyes:      Extraocular Movements: Extraocular movements intact  Conjunctiva/sclera: Conjunctivae normal       Comments: No lid lag, stare, proptosis, or periorbital edema  Neck:      Thyroid: No thyromegaly  Vascular: No carotid bruit  Comments: Thyroid normal in size  No palpable thyroid nodules  No supraclavicular fat pad or buffalo hump  Cardiovascular:      Rate and Rhythm: Normal rate and regular rhythm  Heart sounds: Normal heart sounds  No murmur heard  Pulmonary:      Effort: Pulmonary effort is normal       Breath sounds: Normal breath sounds  No wheezing  Abdominal:      Palpations: Abdomen is soft  Musculoskeletal:         General: No deformity  Normal range of motion  Cervical back: Normal range of motion and neck supple  Right lower leg: No edema  Left lower leg: No edema  Comments: No tremor of the outstretched hands  Lymphadenopathy:      Cervical: No cervical adenopathy  Skin:     General: Skin is warm and dry  Findings: No rash  Comments: No violaceous striae  Neurological:      Mental Status: She is alert and oriented to person, place, and time  Deep Tendon Reflexes: Reflexes are normal and symmetric  Comments: Deep tendon reflexes normal          The history was obtained from the review of the chart and from the patient  Lab Results:   Blood work done on 01/24/2022 showed a prolactin level of 8 5  IGF-1 level is 77  CMP showed a glucose of 76 fasting, sodium 141, but was otherwise normal       TSH is 2 9        Lipids are normal       CBC is normal       Lab Results   Component Value Date    CREATININE 0 96 02/08/2021    CREATININE 0 99 02/03/2020    CREATININE 0 93 01/17/2019    BUN 16 02/08/2021    K 3 9 02/08/2021     02/08/2021    CO2 29 02/08/2021       Lab Results   Component Value Date    ALT 16 02/08/2021    AST 21 02/08/2021    ALKPHOS 107 02/08/2021       Lab Results   Component Value Date    TSH 1 20 02/08/2021    FREET4 1 1 02/08/2021           Future Appointments   Date Time Provider Marisol Pettyi   3/29/2022 12:20 PM Nimesh Smith MD [facility]   3/14/2023  9:40 AM Bobby Daniel MD [facility]

## 2022-03-29 ENCOUNTER — OFFICE VISIT (OUTPATIENT)
Dept: OBGYN CLINIC | Facility: CLINIC | Age: 57
End: 2022-03-29
Payer: MEDICARE

## 2022-03-29 VITALS
DIASTOLIC BLOOD PRESSURE: 72 MMHG | BODY MASS INDEX: 22.38 KG/M2 | WEIGHT: 114 LBS | HEIGHT: 60 IN | SYSTOLIC BLOOD PRESSURE: 110 MMHG

## 2022-03-29 DIAGNOSIS — N95.1 MENOPAUSAL SYMPTOMS: ICD-10-CM

## 2022-03-29 DIAGNOSIS — Z12.31 ENCOUNTER FOR SCREENING MAMMOGRAM FOR BREAST CANCER: ICD-10-CM

## 2022-03-29 DIAGNOSIS — R92.2 DENSE BREAST TISSUE ON MAMMOGRAM: ICD-10-CM

## 2022-03-29 DIAGNOSIS — Z01.419 ENCOUNTER FOR ANNUAL ROUTINE GYNECOLOGICAL EXAMINATION: Primary | ICD-10-CM

## 2022-03-29 PROCEDURE — G0101 CA SCREEN;PELVIC/BREAST EXAM: HCPCS | Performed by: OBSTETRICS & GYNECOLOGY

## 2022-03-29 RX ORDER — FLUOXETINE HYDROCHLORIDE 20 MG/1
CAPSULE ORAL
COMMUNITY

## 2022-03-29 RX ORDER — FLUOXETINE HYDROCHLORIDE 40 MG/1
CAPSULE ORAL
COMMUNITY

## 2022-03-29 NOTE — PROGRESS NOTES
Annual Wellness Visit  [address], [address], [facility] [address] [facility]    ASSESSMENT/PLAN: Pascual Meyer is a 56 y o  Walda Dial who presents for annual gynecologic exam     Encounter for routine gynecologic examination  - Routine well woman exam completed today  - Cervical Cancer Screening: Current ASCCP Guidelines reviewed  Last Pap: 02/15/2021 normal  Next Pap Due: 2 years, routine   - Contraceptive counseling discussed  Current contraception: abstinence,   - Breast Cancer Screening: Last Mammogram 06/30/2021, normal  - Colorectal cancer screening last 9/2017 per staff at [facility], next due 9/2022 (family history polyps  - The following were reviewed in today's visit: mammography screening ordered, menopause, adequate intake of calcium and vitamin D, exercise and healthy diet    Additional problems addressed during this visit:  1  Encounter for annual routine gynecological examination    2  Encounter for screening mammogram for breast cancer  -     Mammo screening bilateral w 3d & cad; Future    3  Dense breast tissue on mammogram  Assessment & Plan:  Discussed with patient that increased breast density can make it harder to detect breast cancer on mammograms, therefore 3D mammograms are recommended for woman with dense breasts to increase detection  Some studies show that women with increased breast density may have 4-5x increase risk of breast cancer over women with low breast denisty  Currently there are studies ongoing looking at how breast ultrasound screening in addition to annual mammogram may help increase breast cancer detection and many radiologists recommend ultrasound for women with very dense breasts (>75% dense) on mammograms  Reviewed the pros and cons of this today and offered order for bilateral breast ultrasound in addition to annual mammogram to patient, but cautioned patient to check with insurance for coverage of this as some insurances may not cover it  Reviewed all this with Ruby Cm, her [employer] representative here today  Orders:  -     US breast screening bilateral complete (ABUS); Future    4  Menopausal symptoms  Assessment & Plan:  No periods in >1 year (as of 3/2022)  Pt c/o hot flashes  Reviewed herbals and supplements  Next visit: 1 year Wellness      CC:  Annual Gynecologic Examination    HPI: Janiya Garcia is a 56 y o  Herbie Bohr who presents for annual gynecologic examination  Per [employer] representative, Ruby Cm is with her, but not present in room during visit and exam   I spoke with him separately with Suzanne's consent  Santiago Iniguez states no periods in last year  She is c/o hot flashes in last few months  She reports she is not sexually active and has not been in the past     She is still riding horses  No breast or pelvic complaints  Ruby Cm separately confirms no menses in 1 year  Gyn History  No LMP recorded  Patient is postmenopausal      Last Pap: 02/15/2021 was normal    She  reports never being sexually active  OB History      Past Medical History:  No date: Anxiety  No date: Asthma      Comment:  symptoms when had a cat  No date: History of hyperprolactinemia      Comment:  resolved, continues to follow with endocrinology  No date: History of pituitary adenoma      Comment:  resolved on  MRI  No date: Hyperlipidemia  No date: Hypertension  No date: Mild intellectual disabilities  No date: Osteopenia  No date: Rosacea     No past surgical history on file       Family History   Problem Relation Age of Onset    Cancer Mother     Cancer Father     No Known Problems Sister         Social History     Tobacco Use    Smoking status: Never Smoker    Smokeless tobacco: Never Used   Vaping Use    Vaping Use: Never used   Substance Use Topics    Alcohol use: Never    Drug use: Never          Current Outpatient Medications:     albuterol (ACCUNEB) 1 25 MG/3ML nebulizer solution, Take 1 ampule by nebulization every 6 (six) hours as needed for wheezing, Disp: , Rfl:     carbamide peroxide (DEBROX) 6 5 % otic solution, 5 drops 2 (two) times a day, Disp: , Rfl:     Cholecalciferol (VITAMIN D3) 5000 units CAPS, Take by mouth daily , Disp: , Rfl:     FLUoxetine (PROzac) 20 mg capsule, 1 capsule, Disp: , Rfl:     FLUoxetine (PROzac) 40 MG capsule, 1 capsule, Disp: , Rfl:     lisinopril (ZESTRIL) 10 mg tablet, Take 10 mg by mouth daily, Disp: , Rfl:     metroNIDAZOLE (METROGEL) 1 % gel, Apply topically daily at bedtime, Disp: , Rfl:     montelukast (SINGULAIR) 10 mg tablet, Take 10 mg by mouth daily at bedtime, Disp: , Rfl:     simvastatin (ZOCOR) 40 mg tablet, Take 40 mg by mouth daily at bedtime, Disp: , Rfl:     She is allergic to dust mite extract       ROS negative except as noted in HPI    Objective:  /72   Ht 5' (1 524 m)   Wt 51 7 kg (114 lb)   Breastfeeding No   BMI 22 26 kg/m²      Physical Exam  Constitutional:       Appearance: Normal appearance  Chest:   Breasts:      Right: Normal  No mass, tenderness or axillary adenopathy  Left: Normal  No mass, tenderness or axillary adenopathy  Abdominal:      Palpations: Abdomen is soft  Tenderness: There is no abdominal tenderness  Genitourinary:     General: Normal vulva  Vagina: No bleeding or lesions  Uterus: Normal  Not tender  Adnexa:         Right: No mass or tenderness  Left: No mass or tenderness  Rectum: No mass or external hemorrhoid  Comments: Spec exam done with peds speculum - very narrow introitus, limited exam - no abnl - cannot see or palpate cervix  Musculoskeletal:         General: Normal range of motion  Lymphadenopathy:      Upper Body:      Right upper body: No axillary adenopathy  Left upper body: No axillary adenopathy  Neurological:      Mental Status: She is alert and oriented to person, place, and time     Psychiatric:         Mood and Affect: Mood normal          Behavior: Behavior normal

## 2022-03-29 NOTE — PATIENT INSTRUCTIONS
- Maintain healthy weight with BMI ideally between 18-25     - Eat a healthy diet, including multiple servings of vegetables and fruits, as well as lean protein sources  - Get at least 150 minutes of moderate aerobic activity or 75 minutes of vigorous aerobic activity a week, or a combination of moderate and vigorous activity  Greater amounts of exercise will provide an even greater health benefit  - Ensure diet provides 1200mg of Calcium daily (divided) and 800IU of vitamin D, or take supplements to meet this  - Safe sex practices recommended      Non-Hormonal Treatments for Menopausal Symptoms:  Hot Flashes, Night Sweats, Irritability    Behavior modification  - layers, wicking clothes, fans, lower temp  - Paced Breathing Technique (search for paced breathing online, apps also  available for your smartphone)  - 2-4x/day for 15-20 min and with onset of hot flash  - acupuncture therapy    Herbals - most take a trial of 12 weeks for response, most common side effect is upset                                stomach    - Estroven (commercial line of menopausal supplements)   - contains Black Cohosh, Soy isoflavones and other supplements   - have a range of symptom specific products    - Black Cohosh (North American plant root)   - commercially available as Remifemin   - 1 tab AM, 1 tab PM (20mg/tab)    - Evening Primrose Oil (American wildflower)   - 3-4mg daily with food    - Isoflavones found in dietary soy - avoid if personal history of Breast Cancer  - diet: 25gr soy/day   - equivalent to 1/2 cup dried soy beans, 1 cup tofu,   or 1/3 cup soy protein  - supplement over the counter: 50-120mg/day

## 2022-04-02 PROBLEM — N95.1 MENOPAUSAL SYMPTOMS: Status: ACTIVE | Noted: 2022-04-02

## 2022-04-02 NOTE — ASSESSMENT & PLAN NOTE
Discussed with patient that increased breast density can make it harder to detect breast cancer on mammograms, therefore 3D mammograms are recommended for woman with dense breasts to increase detection  Some studies show that women with increased breast density may have 4-5x increase risk of breast cancer over women with low breast denisty  Currently there are studies ongoing looking at how breast ultrasound screening in addition to annual mammogram may help increase breast cancer detection and many radiologists recommend ultrasound for women with very dense breasts (>75% dense) on mammograms  Reviewed the pros and cons of this today and offered order for bilateral breast ultrasound in addition to annual mammogram to patient, but cautioned patient to check with insurance for coverage of this as some insurances may not cover it  Reviewed all this with Shawn Noguera, her [employer] representative here today

## 2022-07-05 DIAGNOSIS — Z12.31 ENCOUNTER FOR SCREENING MAMMOGRAM FOR BREAST CANCER: ICD-10-CM

## 2023-03-06 LAB
PROLACTIN SERPL-MCNC: 8.3 NG/ML
T4 FREE SERPL-MCNC: 0.8 NG/DL (ref 0.8–1.8)
TSH SERPL-ACNC: 2.86 MIU/L (ref 0.4–4.5)

## 2023-03-20 ENCOUNTER — OFFICE VISIT (OUTPATIENT)
Dept: ENDOCRINOLOGY | Facility: HOSPITAL | Age: 58
End: 2023-03-20

## 2023-03-20 VITALS
DIASTOLIC BLOOD PRESSURE: 82 MMHG | BODY MASS INDEX: 22.54 KG/M2 | HEART RATE: 73 BPM | SYSTOLIC BLOOD PRESSURE: 126 MMHG | WEIGHT: 114.8 LBS | HEIGHT: 60 IN

## 2023-03-20 DIAGNOSIS — Z87.898 HISTORY OF PITUITARY TUMOR: ICD-10-CM

## 2023-03-20 DIAGNOSIS — E22.1 HYPERPROLACTINEMIA (HCC): Primary | ICD-10-CM

## 2023-03-20 RX ORDER — CARBOXYMETHYLCELLULOSE SODIUM AND GLYCERIN 5; 9 MG/ML; MG/ML
SOLUTION/ DROPS OPHTHALMIC
COMMUNITY

## 2023-03-20 RX ORDER — DOCUSATE SODIUM 100 MG/1
100 CAPSULE, LIQUID FILLED ORAL DAILY
COMMUNITY

## 2023-03-20 NOTE — PROGRESS NOTES
3/20/2023    Assessment/Plan      Diagnoses and all orders for this visit:    Hyperprolactinemia ([address] )  -     Comprehensive metabolic panel Lab Collect; Future  -     CBC and differential Lab Collect; Future  -     Insulin-like growth factor 1 (IGF-1) Lab Collect; Future  -     Prolactin Lab Collect; Future  -     T4, free Lab Collect; Future  -     TSH, 3rd generation Lab Collect; Future    History of pituitary tumor  -     Comprehensive metabolic panel Lab Collect; Future  -     CBC and differential Lab Collect; Future  -     Insulin-like growth factor 1 (IGF-1) Lab Collect; Future  -     Prolactin Lab Collect; Future  -     T4, free Lab Collect; Future  -     TSH, 3rd generation Lab Collect; Future    Other orders  -     docusate sodium (COLACE) 100 mg capsule; Take 100 mg by mouth in the morning  -     Carboxymethylcellul-Glycerin (Refresh Optive) 0 5-0 9 % SOLN; Apply to eye        Assessment/Plan:  1  Hyperprolactinemia  Recent prolactin level is normal   She does not have any hyperprolactinemia at this time  I will continue to follow these levels over time  2   Pituitary tumor  She had a pituitary tumor in the past for which a follow-up ultrasound in 2017 showed resolution of her pituitary tumor  No other pituitary abnormalities have been noted on blood work  I have asked her to follow-up in 1 year with preceding CBC, CMP, prolactin, IGF-I level, TSH, free T4       CC: Pituitary and prolactin follow-up    History of Present Illness     HPI: Cesilia Bowie is a 66y o  year old female with history of hyperprolactinemia with a pituitary adenoma here for follow-up visit  She was found to have hyperprolactinemia in 2009 and pituitary microadenoma was noted on the workup via MRI  Follow-up MRI in 2017 showed resolution of her pituitary tumor  At 1 point, she was on cabergoline to control her prolactin and this was discontinued in 2017 when pituitary tumor resolved on MRI    Prolactin has been followed over time and has remained normal ever since  Weight is 2 pounds less than last year  She can be hot and sweaty at times but has no actual heat or cold intolerance  She denies diarrhea or constipation, fatigue, tremors, palpitation, or depression  She has some anxiety  She denies insomnia, dry skin, brittle nails, or hair loss  She denies diplopia  She has no galactorrhea  She has no orthostatic symptoms  She has no nausea or abdominal pain  Review of Systems   Constitutional: Negative for fatigue and unexpected weight change  Weight 2 pounds less than last year  HENT: Negative for trouble swallowing  Eyes: Negative for visual disturbance  Wears glasses  Respiratory: Negative for chest tightness and shortness of breath  Cardiovascular: Negative for chest pain and palpitations  Gastrointestinal: Negative for abdominal pain, constipation, diarrhea and nausea  Endocrine: Negative for cold intolerance and heat intolerance  Can be hot and sweaty at times  Genitourinary:        Postmenopausal  No galactorrhea  Skin: Negative for rash  No dry  Skin  No brittle nails  No hair loss  Neurological: Negative for dizziness, tremors, light-headedness and headaches  No orthostatic symptoms  Psychiatric/Behavioral: Negative for dysphoric mood and sleep disturbance  The patient is nervous/anxious  Always anxious per staff  Historical Information   Past Medical History:   Diagnosis Date   • Anxiety    • Asthma     symptoms when had a cat   • History of hyperprolactinemia     resolved, continues to follow with endocrinology   • History of pituitary adenoma     resolved on 2017 MRI   • Hyperlipidemia    • Hypertension    • Mild intellectual disabilities    • Osteopenia    • Rosacea      History reviewed  No pertinent surgical history    Social History   Social History     Substance and Sexual Activity   Alcohol Use Never     Social History Substance and Sexual Activity   Drug Use Never     Social History     Tobacco Use   Smoking Status Never   Smokeless Tobacco Never     Family History:   Family History   Problem Relation Age of Onset   • Cancer Mother    • Cancer Father    • No Known Problems Sister        Meds/Allergies   Current Outpatient Medications   Medication Sig Dispense Refill   • albuterol (ACCUNEB) 1 25 MG/3ML nebulizer solution Take 1 ampule by nebulization every 6 (six) hours as needed for wheezing     • carbamide peroxide (DEBROX) 6 5 % otic solution 5 drops 2 (two) times a day     • Carboxymethylcellul-Glycerin (Refresh Optive) 0 5-0 9 % SOLN Apply to eye     • Cholecalciferol (VITAMIN D3) 5000 units CAPS Take by mouth daily      • docusate sodium (COLACE) 100 mg capsule Take 100 mg by mouth in the morning     • FLUoxetine (PROzac) 20 mg capsule 1 capsule     • FLUoxetine (PROzac) 40 MG capsule 1 capsule     • lisinopril (ZESTRIL) 10 mg tablet Take 10 mg by mouth daily     • metroNIDAZOLE (METROGEL) 1 % gel Apply topically daily at bedtime     • montelukast (SINGULAIR) 10 mg tablet Take 10 mg by mouth daily at bedtime     • simvastatin (ZOCOR) 40 mg tablet Take 40 mg by mouth daily at bedtime       No current facility-administered medications for this visit  Allergies   Allergen Reactions   • Dust Mite Extract        Objective   Vitals: Blood pressure 126/82, pulse 73, height 5' (1 524 m), weight 52 1 kg (114 lb 12 8 oz), not currently breastfeeding  Invasive Devices     None                 Physical Exam  Vitals reviewed  Constitutional:       Appearance: Normal appearance  She is well-developed  HENT:      Head: Normocephalic and atraumatic  Eyes:      Extraocular Movements: Extraocular movements intact  Conjunctiva/sclera: Conjunctivae normal       Comments: No lid lag, stare, proptosis, or periorbital edema  Neck:      Thyroid: No thyromegaly  Vascular: No carotid bruit        Comments: Thyroid normal in size   No palpable thyroid nodules  No supraclavicular fat pad or buffalo hump  Cardiovascular:      Rate and Rhythm: Normal rate and regular rhythm  Heart sounds: Normal heart sounds  No murmur heard  Pulmonary:      Effort: Pulmonary effort is normal       Breath sounds: Normal breath sounds  No wheezing  Abdominal:      Palpations: Abdomen is soft  Musculoskeletal:         General: No deformity  Normal range of motion  Cervical back: Normal range of motion and neck supple  Right lower leg: No edema  Left lower leg: No edema  Comments: No tremor of the outstretched hands  Lymphadenopathy:      Cervical: No cervical adenopathy  Skin:     General: Skin is warm and dry  Findings: No rash  Neurological:      General: No focal deficit present  Mental Status: She is alert  Deep Tendon Reflexes: Reflexes are normal and symmetric  Comments: Patellar deep tendon reflexes normal          The history was obtained from the review of the chart and from the patient and group home staff member  Lab Results:      Blood work done on 3/6/2023 showed a TSH of 2 86 with a free T4 of 0 8  Prolactin level is 8 3      Lab Results   Component Value Date    CREATININE 0 96 02/08/2021    CREATININE 0 99 02/03/2020    CREATININE 0 93 01/17/2019    BUN 16 02/08/2021    K 3 9 02/08/2021     02/08/2021    CO2 29 02/08/2021         Lab Results   Component Value Date    ALT 16 02/08/2021    AST 21 02/08/2021    ALKPHOS 107 02/08/2021       Lab Results   Component Value Date    TSH 2 86 03/06/2023    FREET4 0 8 03/06/2023             Future Appointments   Date Time Provider [facility]   4/5/2023  1:00 PM Mckayla Charles MD [facility]   3/20/2024  1:20 PM Jose R Hoyt MD [facility]

## 2023-04-05 ENCOUNTER — ANNUAL EXAM (OUTPATIENT)
Dept: OBGYN CLINIC | Facility: CLINIC | Age: 58
End: 2023-04-05

## 2023-04-05 VITALS
WEIGHT: 114 LBS | SYSTOLIC BLOOD PRESSURE: 102 MMHG | BODY MASS INDEX: 22.38 KG/M2 | DIASTOLIC BLOOD PRESSURE: 68 MMHG | HEIGHT: 60 IN

## 2023-04-05 DIAGNOSIS — R92.2 DENSE BREAST TISSUE ON MAMMOGRAM: ICD-10-CM

## 2023-04-05 DIAGNOSIS — N89.8 VAGINAL DISCHARGE: ICD-10-CM

## 2023-04-05 DIAGNOSIS — Z12.31 ENCOUNTER FOR SCREENING MAMMOGRAM FOR MALIGNANT NEOPLASM OF BREAST: ICD-10-CM

## 2023-04-05 DIAGNOSIS — Z01.419 ENCOUNTER FOR ANNUAL ROUTINE GYNECOLOGICAL EXAMINATION: Primary | ICD-10-CM

## 2023-04-05 NOTE — LETTER
April 5, 2023     Ro Mendez, [address] [facility] [id number] [facility]    Patient: Urvashi Garza   YOB: 1965   Date of Visit: 4/5/2023       Dear Dr Toyin Orlando:    Thank you for referring Urvashi Garza to me for evaluation  Below are my notes for this consultation  If you have questions, please do not hesitate to call me  I look forward to following your patient along with you  Sincerely,        Ezra Nageotte, MD        CC: No Recipients  Ezra Nageotte, MD  4/5/2023  1:43 PM  Sign when Signing Visit  Annual Wellness Visit  [address] [place] [address], [address], [facility], [address] [facility]    ASSESSMENT/PLAN: Urvashi Garza is a 66 y o  Kellen Molina who presents for annual gynecologic exam     Encounter for routine gynecologic examination  - Routine well woman exam completed today  - Cervical Cancer Screening: Current ASCCP Guidelines reviewed  Last Pap: 2/15/2021 normal  Next Pap Due: 1-2 years, routine   - Contraceptive counseling discussed  Current contraception: abstinence, postmenopausal  - Breast Cancer Screening: Last Mammogram 07/01/2022, normal  - Colorectal cancer screening last 2022, next due 2027  - The following were reviewed in today's visit: mammography screening ordered, menopause, exercise and healthy diet    Additional problems addressed during this visit:  1  Encounter for annual routine gynecological examination    2  Encounter for screening mammogram for malignant neoplasm of breast  -     Mammo screening bilateral w 3d & cad; Future    3  Dense breast tissue on mammogram  Assessment & Plan:  Normal imaging still with dense breasts  Recom u/s in addition to annual mammogram   Order provided  Orders:  -     US breast screening bilateral complete (ABUS); Future    4  Vaginal discharge  Comments:  c/o sometimes yellow vaginal discharge, sometimes odor  No pain  Exam normal today  Reassured sometimes postmenopausal discharge   If pain or worsens, return for exam       Next visit: 1 year Wellness      CC:  Annual Gynecologic Examination    HPI: Lashanda Courser is a 66 y o  Rheshahzad Pandey who presents for annual gynecologic examination  She denies any breast, urinary issues at today's visit  Presents today with Ki Sinha from [facility]  No periods in a couple years  Has some hot flashes but not too bad  Notes some yellow vaginal discharge sometimes with smell but no pain  None currently  Has never been sexually active  Gyn History  No LMP recorded  Patient is postmenopausal      Last Pap: 2022 was normal    She  reports never being sexually active  OB History      Past Medical History:  No date: Anxiety  No date: Asthma      Comment:  symptoms when had a cat  No date: History of hyperprolactinemia      Comment:  resolved, continues to follow with endocrinology  No date: History of pituitary adenoma      Comment:  resolved on  MRI  No date: Hyperlipidemia  No date: Hypertension  No date: Mild intellectual disabilities  No date: Osteopenia  No date: Rosacea     No past surgical history on file       Family History   Problem Relation Age of Onset   • Cancer Mother    • Cancer Father    • No Known Problems Sister         Social History     Tobacco Use   • Smoking status: Never   • Smokeless tobacco: Never   Vaping Use   • Vaping Use: Never used   Substance Use Topics   • Alcohol use: Never   • Drug use: Never          Current Outpatient Medications:   •  albuterol (ACCUNEB) 1 25 MG/3ML nebulizer solution, Take 1 ampule by nebulization every 6 (six) hours as needed for wheezing, Disp: , Rfl:   •  Carboxymethylcellul-Glycerin (Refresh Optive) 0 5-0 9 % SOLN, Apply to eye, Disp: , Rfl:   •  Cholecalciferol (VITAMIN D3) 5000 units CAPS, Take by mouth daily , Disp: , Rfl:   •  docusate sodium (COLACE) 100 mg capsule, Take 100 mg by mouth in the morning, Disp: , Rfl:   •  FLUoxetine (PROzac) 20 mg capsule, 1 capsule, Disp: , Rfl:   •  FLUoxetine (PROzac) 40 MG capsule, 1 capsule, Disp: , Rfl:   •  lisinopril (ZESTRIL) 10 mg tablet, Take 10 mg by mouth daily, Disp: , Rfl:   •  montelukast (SINGULAIR) 10 mg tablet, Take 10 mg by mouth daily at bedtime, Disp: , Rfl:   •  simvastatin (ZOCOR) 40 mg tablet, Take 40 mg by mouth daily at bedtime, Disp: , Rfl:     She is allergic to dust mite extract       ROS negative except as noted in HPI    Objective:  /68 (BP Location: Right arm, Patient Position: Sitting, Cuff Size: Standard)   Ht 5' (1 524 m)   Wt 51 7 kg (114 lb)   BMI 22 26 kg/m²     Physical Exam  Constitutional:       Appearance: Normal appearance  Chest:   Breasts:     Right: Normal  No mass or tenderness  Left: Normal  No mass or tenderness  Abdominal:      Palpations: Abdomen is soft  Tenderness: There is no abdominal tenderness  Genitourinary:     General: Normal vulva  Vagina: No vaginal discharge, tenderness, bleeding or lesions  Uterus: Normal  Not tender  Adnexa:         Right: No mass or tenderness  Left: No mass or tenderness  Rectum: No mass or external hemorrhoid  Comments: Spec exam done with peds speculum - very narrow introitus, limited exam - no abnl - cannot see or palpate cervix  Musculoskeletal:         General: Normal range of motion  Lymphadenopathy:      Upper Body:      Right upper body: No axillary adenopathy  Left upper body: No axillary adenopathy  Neurological:      Mental Status: She is alert and oriented to person, place, and time     Psychiatric:         Mood and Affect: Mood normal          Behavior: Behavior normal

## 2023-04-05 NOTE — PROGRESS NOTES
Annual Wellness Visit  [address], [address], [facility], [address] [facility]    ASSESSMENT/PLAN: Josh Jacobs is a 66 y o  Jing Keshia who presents for annual gynecologic exam     Encounter for routine gynecologic examination  - Routine well woman exam completed today  - Cervical Cancer Screening: Current ASCCP Guidelines reviewed  Last Pap: 2/15/2021 normal  Next Pap Due: 1-2 years, routine   - Contraceptive counseling discussed  Current contraception: abstinence, postmenopausal  - Breast Cancer Screening: Last Mammogram 07/01/2022, normal  - Colorectal cancer screening last 2022, next due 2027  - The following were reviewed in today's visit: mammography screening ordered, menopause, exercise and healthy diet    Additional problems addressed during this visit:  1  Encounter for annual routine gynecological examination    2  Encounter for screening mammogram for malignant neoplasm of breast  -     Mammo screening bilateral w 3d & cad; Future    3  Dense breast tissue on mammogram  Assessment & Plan:  Normal imaging still with dense breasts  Recom u/s in addition to annual mammogram   Order provided  Orders:  -     US breast screening bilateral complete (ABUS); Future    4  Vaginal discharge  Comments:  c/o sometimes yellow vaginal discharge, sometimes odor  No pain  Exam normal today  Reassured sometimes postmenopausal discharge  If pain or worsens, return for exam       Next visit: 1 year Wellness      CC:  Annual Gynecologic Examination    HPI: Josh Jacobs is a 66 y o  Jing Keshia who presents for annual gynecologic examination  She denies any breast, urinary issues at today's visit  Presents today with Cynthia Foreman from [employer]  No periods in a couple years  Has some hot flashes but not too bad  Notes some yellow vaginal discharge sometimes with smell but no pain  None currently  Has never been sexually active  Gyn History  No LMP recorded   Patient is postmenopausal      Last Pap: 2022 was normal    She  reports never being sexually active  OB History      Past Medical History:  No date: Anxiety  No date: Asthma      Comment:  symptoms when had a cat  No date: History of hyperprolactinemia      Comment:  resolved, continues to follow with endocrinology  No date: History of pituitary adenoma      Comment:  resolved on  MRI  No date: Hyperlipidemia  No date: Hypertension  No date: Mild intellectual disabilities  No date: Osteopenia  No date: Rosacea     No past surgical history on file  Family History   Problem Relation Age of Onset   • Cancer Mother    • Cancer Father    • No Known Problems Sister         Social History     Tobacco Use   • Smoking status: Never   • Smokeless tobacco: Never   Vaping Use   • Vaping Use: Never used   Substance Use Topics   • Alcohol use: Never   • Drug use: Never          Current Outpatient Medications:   •  albuterol (ACCUNEB) 1 25 MG/3ML nebulizer solution, Take 1 ampule by nebulization every 6 (six) hours as needed for wheezing, Disp: , Rfl:   •  Carboxymethylcellul-Glycerin (Refresh Optive) 0 5-0 9 % SOLN, Apply to eye, Disp: , Rfl:   •  Cholecalciferol (VITAMIN D3) 5000 units CAPS, Take by mouth daily , Disp: , Rfl:   •  docusate sodium (COLACE) 100 mg capsule, Take 100 mg by mouth in the morning, Disp: , Rfl:   •  FLUoxetine (PROzac) 20 mg capsule, 1 capsule, Disp: , Rfl:   •  FLUoxetine (PROzac) 40 MG capsule, 1 capsule, Disp: , Rfl:   •  lisinopril (ZESTRIL) 10 mg tablet, Take 10 mg by mouth daily, Disp: , Rfl:   •  montelukast (SINGULAIR) 10 mg tablet, Take 10 mg by mouth daily at bedtime, Disp: , Rfl:   •  simvastatin (ZOCOR) 40 mg tablet, Take 40 mg by mouth daily at bedtime, Disp: , Rfl:     She is allergic to dust mite extract       ROS negative except as noted in HPI    Objective:  /68 (BP Location: Right arm, Patient Position: Sitting, Cuff Size: Standard)   Ht 5' (1 524 m)   Wt 51 7 kg (114 lb)   BMI 22 26 kg/m²      Physical Exam  Constitutional:       Appearance: Normal appearance  Chest:   Breasts:     Right: Normal  No mass or tenderness  Left: Normal  No mass or tenderness  Abdominal:      Palpations: Abdomen is soft  Tenderness: There is no abdominal tenderness  Genitourinary:     General: Normal vulva  Vagina: No vaginal discharge, tenderness, bleeding or lesions  Uterus: Normal  Not tender  Adnexa:         Right: No mass or tenderness  Left: No mass or tenderness  Rectum: No mass or external hemorrhoid  Comments: Spec exam done with peds speculum - very narrow introitus, limited exam - no abnl - cannot see or palpate cervix  Musculoskeletal:         General: Normal range of motion  Lymphadenopathy:      Upper Body:      Right upper body: No axillary adenopathy  Left upper body: No axillary adenopathy  Neurological:      Mental Status: She is alert and oriented to person, place, and time     Psychiatric:         Mood and Affect: Mood normal          Behavior: Behavior normal

## 2023-04-05 NOTE — ASSESSMENT & PLAN NOTE
Normal imaging still with dense breasts  Recom u/s in addition to annual mammogram   Order provided

## 2023-07-21 ENCOUNTER — HOSPITAL ENCOUNTER (OUTPATIENT)
Dept: MAMMOGRAPHY | Facility: CLINIC | Age: 58
Discharge: HOME | End: 2023-07-21
Payer: MEDICARE

## 2023-07-21 ENCOUNTER — HOSPITAL ENCOUNTER (OUTPATIENT)
Dept: ULTRASOUND IMAGING | Facility: CLINIC | Age: 58
Discharge: HOME | End: 2023-07-21
Payer: MEDICARE

## 2023-07-21 VITALS — WEIGHT: 113.98 LBS | BODY MASS INDEX: 21.52 KG/M2 | HEIGHT: 61 IN

## 2023-07-21 DIAGNOSIS — Z12.31 ENCOUNTER FOR SCREENING MAMMOGRAM FOR MALIGNANT NEOPLASM OF BREAST: ICD-10-CM

## 2023-07-21 DIAGNOSIS — R92.2 DENSE BREAST TISSUE ON MAMMOGRAM: ICD-10-CM

## 2023-07-21 PROCEDURE — 77063 BREAST TOMOSYNTHESIS BI: CPT

## 2023-07-21 PROCEDURE — 76641 ULTRASOUND BREAST COMPLETE: CPT

## 2023-07-21 PROCEDURE — 77067 SCR MAMMO BI INCL CAD: CPT

## 2024-03-20 ENCOUNTER — OFFICE VISIT (OUTPATIENT)
Dept: ENDOCRINOLOGY | Facility: HOSPITAL | Age: 59
End: 2024-03-20
Payer: MEDICARE

## 2024-03-20 VITALS
HEART RATE: 83 BPM | BODY MASS INDEX: 21.3 KG/M2 | WEIGHT: 112.8 LBS | DIASTOLIC BLOOD PRESSURE: 80 MMHG | SYSTOLIC BLOOD PRESSURE: 122 MMHG | OXYGEN SATURATION: 99 % | HEIGHT: 61 IN

## 2024-03-20 DIAGNOSIS — E22.1 HYPERPROLACTINEMIA (HCC): Primary | ICD-10-CM

## 2024-03-20 DIAGNOSIS — Z87.898 HISTORY OF PITUITARY TUMOR: ICD-10-CM

## 2024-03-20 PROCEDURE — 99214 OFFICE O/P EST MOD 30 MIN: CPT | Performed by: INTERNAL MEDICINE

## 2024-03-20 NOTE — PATIENT INSTRUCTIONS
I have no recent blood work.     Let's get blood work done now.     Follow up in 1 year with blood work.

## 2024-03-20 NOTE — PROGRESS NOTES
3/20/2024    Assessment/Plan      Diagnoses and all orders for this visit:    Hyperprolactinemia (HCC)  -     CBC and differential  -     Comprehensive metabolic panel  -     Insulin-like growth factor 1 (IGF-1)  -     T4, free; Future  -     TSH, 3rd generation; Future  -     Prolactin  -     CBC and differential; Future  -     Comprehensive metabolic panel; Future  -     Insulin-like growth factor 1 (IGF-1); Future  -     TSH, 3rd generation; Future  -     T4, free; Future  -     Prolactin; Future    History of pituitary tumor  -     CBC and differential  -     Comprehensive metabolic panel  -     Insulin-like growth factor 1 (IGF-1)  -     T4, free; Future  -     TSH, 3rd generation; Future  -     Prolactin  -     CBC and differential; Future  -     Comprehensive metabolic panel; Future  -     Insulin-like growth factor 1 (IGF-1); Future  -     TSH, 3rd generation; Future  -     T4, free; Future  -     Prolactin; Future        Assessment/Plan:  1.  Hyperprolactinemia.  I have no recent blood work.  I will have her get a prolactin level done now.    2.  History of pituitary tumor.  Her last MRI showed resolution of the tumor.  I have been following pituitary function studies yearly.  I do not have any recent blood work.    I have asked her to get blood work done now consisting of a CMP, CBC, TSH, free T4, IGF-I level, and prolactin level.    I have asked her to follow-up in 1 year with preceding CMP, CBC, TSH, free T4, prolactin, and IGF-I level.      CC: Prolactin, pituitary follow-up    History of Present Illness     HPI: Suzanne Vasquez is a 59 y.o. year old female with history of prolactinemia with pituitary adenoma here for follow-up visit.    She was found to have hyperprolactinemia in 2009 and pituitary microadenoma was noted on the workup via MRI.  Follow-up MRI in 2017 showed resolution of her pituitary tumor.  At 1 point, she was on cabergoline to control her prolactin and this was discontinued in 2017 when  pituitary tumor resolved on MRI.  Prolactin has been followed over time and has remained normal ever since.    She denies galactorrhea.  She has no breast tenderness.  She has no menses as she is postmenopausal.  She denies headaches or orthostatic symptoms.  She denies loss of vision.  She can be hot or cold at times.  She denies palpitation, tremors, or diarrhea.  She does have occasional constipation.  She denies insomnia or fatigue.  Weight is stable.  She denies dry skin, brittle nails, or hair loss.       Review of Systems   Constitutional:  Negative for appetite change, fatigue and unexpected weight change.        Will nap daily   HENT:  Negative for trouble swallowing.    Eyes:  Negative for visual disturbance.        Wears glasses.    Respiratory:  Negative for chest tightness and shortness of breath.    Cardiovascular:  Negative for chest pain and palpitations.   Gastrointestinal:  Positive for constipation. Negative for abdominal pain, diarrhea and nausea.        Occasional constipation.    Endocrine: Positive for cold intolerance and heat intolerance.        Can be hot or cold.    Genitourinary:         Postmenopausal. No glactorrhea. No breast tenderness.    Skin:  Negative for rash.        No dry skin. No brittle nails. No hair loss.    Neurological:  Negative for dizziness, tremors, light-headedness and headaches.        No orthostatic symptoms.    Psychiatric/Behavioral:  Negative for sleep disturbance.        Historical Information   Past Medical History:   Diagnosis Date    Anxiety     Asthma     symptoms when had a cat    History of hyperprolactinemia     resolved, continues to follow with endocrinology    History of pituitary adenoma     resolved on 2017 MRI    Hyperlipidemia     Hypertension     Mild intellectual disabilities     Osteopenia     Rosacea      History reviewed. No pertinent surgical history.  Social History   Social History     Substance and Sexual Activity   Alcohol Use Never  "    Social History     Substance and Sexual Activity   Drug Use Never     Social History     Tobacco Use   Smoking Status Never   Smokeless Tobacco Never     Family History:   Family History   Problem Relation Age of Onset    Cancer Mother     Cancer Father     No Known Problems Sister        Meds/Allergies   Current Outpatient Medications   Medication Sig Dispense Refill    albuterol (ACCUNEB) 1.25 MG/3ML nebulizer solution Take 1 ampule by nebulization every 6 (six) hours as needed for wheezing      Carboxymethylcellul-Glycerin (Refresh Optive) 0.5-0.9 % SOLN Apply to eye      Cholecalciferol (VITAMIN D3) 5000 units CAPS Take by mouth daily       docusate sodium (COLACE) 100 mg capsule Take 100 mg by mouth in the morning      FLUoxetine (PROzac) 20 mg capsule 1 capsule      FLUoxetine (PROzac) 40 MG capsule 1 capsule      lisinopril (ZESTRIL) 10 mg tablet Take 10 mg by mouth daily      montelukast (SINGULAIR) 10 mg tablet Take 10 mg by mouth daily at bedtime      simvastatin (ZOCOR) 40 mg tablet Take 40 mg by mouth daily at bedtime       No current facility-administered medications for this visit.     Allergies   Allergen Reactions    Dust Mite Extract        Objective   Vitals: Blood pressure 122/80, pulse 83, height 5' 1\" (1.549 m), weight 51.2 kg (112 lb 12.8 oz), SpO2 99%, not currently breastfeeding.  Invasive Devices       None                   Physical Exam  Vitals reviewed.   Constitutional:       Appearance: Normal appearance. She is well-developed and normal weight.   HENT:      Head: Normocephalic and atraumatic.      Comments: No moon facies.  Eyes:      Extraocular Movements: Extraocular movements intact.      Conjunctiva/sclera: Conjunctivae normal.      Comments: No lid lag, stare, proptosis, or periorbital edema.   Neck:      Thyroid: No thyromegaly.      Vascular: No carotid bruit.      Comments: Thyroid normal in size.  No supraclavicular fat pad or buffalo hump.  Cardiovascular:      Rate and " Rhythm: Normal rate and regular rhythm.      Heart sounds: Normal heart sounds. No murmur heard.  Pulmonary:      Effort: Pulmonary effort is normal.      Breath sounds: Normal breath sounds. No wheezing.   Abdominal:      Palpations: Abdomen is soft.   Musculoskeletal:         General: No deformity.      Cervical back: Normal range of motion and neck supple.      Right lower leg: No edema.      Left lower leg: No edema.      Comments: No tremor of the outstretched hands.   Lymphadenopathy:      Cervical: No cervical adenopathy.   Skin:     General: Skin is warm and dry.      Findings: No rash.   Neurological:      Mental Status: She is alert and oriented to person, place, and time.      Deep Tendon Reflexes: Reflexes are normal and symmetric.      Comments: Patellar deep tendon reflexes normal.         The history was obtained from the review of the chart and from the patient and group home staff member.    Lab Results:      I have no recent blood work as her blood work was not done for this visit.    Lab Results   Component Value Date    CREATININE 0.96 02/08/2021    CREATININE 0.99 02/03/2020    CREATININE 0.93 01/17/2019    BUN 16 02/08/2021    K 3.9 02/08/2021     02/08/2021    CO2 29 02/08/2021         Lab Results   Component Value Date    ALT 16 02/08/2021    AST 21 02/08/2021    ALKPHOS 107 02/08/2021       Lab Results   Component Value Date    TSH 2.86 03/06/2023    FREET4 0.8 03/06/2023             Future Appointments   Date Time Provider Department Center   4/17/2024  1:00 PM Bharti Gomes MD [facility]   3/18/2025  1:40 PM Lindsey Cota MD [facility]

## 2024-04-02 LAB
ALBUMIN SERPL-MCNC: 4.1 G/DL (ref 3.6–5.1)
ALBUMIN/GLOB SERPL: 1.4 (CALC) (ref 1–2.5)
ALP SERPL-CCNC: 107 U/L (ref 37–153)
ALT SERPL-CCNC: 15 U/L (ref 6–29)
AST SERPL-CCNC: 18 U/L (ref 10–35)
BASOPHILS # BLD AUTO: 51 CELLS/UL (ref 0–200)
BASOPHILS NFR BLD AUTO: 0.7 %
BILIRUB SERPL-MCNC: 0.4 MG/DL (ref 0.2–1.2)
BUN SERPL-MCNC: 18 MG/DL (ref 7–25)
BUN/CREAT SERPL: NORMAL (CALC) (ref 6–22)
CALCIUM SERPL-MCNC: 9.5 MG/DL (ref 8.6–10.4)
CHLORIDE SERPL-SCNC: 104 MMOL/L (ref 98–110)
CO2 SERPL-SCNC: 30 MMOL/L (ref 20–32)
CREAT SERPL-MCNC: 0.89 MG/DL (ref 0.5–1.03)
EOSINOPHIL # BLD AUTO: 161 CELLS/UL (ref 15–500)
EOSINOPHIL NFR BLD AUTO: 2.2 %
ERYTHROCYTE [DISTWIDTH] IN BLOOD BY AUTOMATED COUNT: 12.9 % (ref 11–15)
GFR/BSA.PRED SERPLBLD CYS-BASED-ARV: 76 ML/MIN/1.73M2
GLOBULIN SER CALC-MCNC: 2.9 G/DL (CALC) (ref 1.9–3.7)
GLUCOSE SERPL-MCNC: 78 MG/DL (ref 65–99)
HCT VFR BLD AUTO: 40 % (ref 35–45)
HGB BLD-MCNC: 13.2 G/DL (ref 11.7–15.5)
LYMPHOCYTES # BLD AUTO: 1540.3 CELLS/UL (ref 850–3900)
LYMPHOCYTES NFR BLD AUTO: 21.1 %
MCH RBC QN AUTO: 29 PG (ref 27–33)
MCHC RBC AUTO-ENTMCNC: 33 G/DL (ref 32–36)
MCV RBC AUTO: 87.9 FL (ref 80–100)
MONOCYTES # BLD AUTO: 489 CELLS/UL (ref 200–950)
MONOCYTES NFR BLD AUTO: 6.7 %
NEUTROPHILS # BLD AUTO: 5059 CELLS/UL (ref 1500–7800)
NEUTROPHILS NFR BLD AUTO: 69.3 %
PLATELET # BLD AUTO: 222 THOUSAND/UL (ref 140–400)
PMV BLD REES-ECKER: 11.7 FL (ref 7.5–12.5)
POTASSIUM SERPL-SCNC: 4 MMOL/L (ref 3.5–5.3)
PROLACTIN SERPL-MCNC: 8.1 NG/ML
PROT SERPL-MCNC: 7 G/DL (ref 6.1–8.1)
RBC # BLD AUTO: 4.55 MILLION/UL (ref 3.8–5.1)
SODIUM SERPL-SCNC: 141 MMOL/L (ref 135–146)
T4 FREE SERPL-MCNC: 1 NG/DL (ref 0.8–1.8)
TSH SERPL-ACNC: 2.4 MIU/L (ref 0.4–4.5)
WBC # BLD AUTO: 7.3 THOUSAND/UL (ref 3.8–10.8)

## 2024-04-08 LAB
ALBUMIN SERPL-MCNC: 4.1 G/DL (ref 3.6–5.1)
ALBUMIN/GLOB SERPL: 1.4 (CALC) (ref 1–2.5)
ALP SERPL-CCNC: 107 U/L (ref 37–153)
ALT SERPL-CCNC: 15 U/L (ref 6–29)
AST SERPL-CCNC: 18 U/L (ref 10–35)
BASOPHILS # BLD AUTO: 51 CELLS/UL (ref 0–200)
BASOPHILS NFR BLD AUTO: 0.7 %
BILIRUB SERPL-MCNC: 0.4 MG/DL (ref 0.2–1.2)
BUN SERPL-MCNC: 18 MG/DL (ref 7–25)
BUN/CREAT SERPL: NORMAL (CALC) (ref 6–22)
CALCIUM SERPL-MCNC: 9.5 MG/DL (ref 8.6–10.4)
CHLORIDE SERPL-SCNC: 104 MMOL/L (ref 98–110)
CO2 SERPL-SCNC: 30 MMOL/L (ref 20–32)
CREAT SERPL-MCNC: 0.89 MG/DL (ref 0.5–1.03)
EOSINOPHIL # BLD AUTO: 161 CELLS/UL (ref 15–500)
EOSINOPHIL NFR BLD AUTO: 2.2 %
ERYTHROCYTE [DISTWIDTH] IN BLOOD BY AUTOMATED COUNT: 12.9 % (ref 11–15)
GFR/BSA.PRED SERPLBLD CYS-BASED-ARV: 76 ML/MIN/1.73M2
GLOBULIN SER CALC-MCNC: 2.9 G/DL (CALC) (ref 1.9–3.7)
GLUCOSE SERPL-MCNC: 78 MG/DL (ref 65–99)
HCT VFR BLD AUTO: 40 % (ref 35–45)
HGB BLD-MCNC: 13.2 G/DL (ref 11.7–15.5)
IGF-I SERPL-MCNC: 85 NG/ML (ref 50–317)
IGF-I Z-SCORE SERPL: -0.9 SD
LYMPHOCYTES # BLD AUTO: 1540.3 CELLS/UL (ref 850–3900)
LYMPHOCYTES NFR BLD AUTO: 21.1 %
MCH RBC QN AUTO: 29 PG (ref 27–33)
MCHC RBC AUTO-ENTMCNC: 33 G/DL (ref 32–36)
MCV RBC AUTO: 87.9 FL (ref 80–100)
MONOCYTES # BLD AUTO: 489 CELLS/UL (ref 200–950)
MONOCYTES NFR BLD AUTO: 6.7 %
NEUTROPHILS # BLD AUTO: 5059 CELLS/UL (ref 1500–7800)
NEUTROPHILS NFR BLD AUTO: 69.3 %
PLATELET # BLD AUTO: 222 THOUSAND/UL (ref 140–400)
PMV BLD REES-ECKER: 11.7 FL (ref 7.5–12.5)
POTASSIUM SERPL-SCNC: 4 MMOL/L (ref 3.5–5.3)
PROLACTIN SERPL-MCNC: 8.1 NG/ML
PROT SERPL-MCNC: 7 G/DL (ref 6.1–8.1)
RBC # BLD AUTO: 4.55 MILLION/UL (ref 3.8–5.1)
SODIUM SERPL-SCNC: 141 MMOL/L (ref 135–146)
T4 FREE SERPL-MCNC: 1 NG/DL (ref 0.8–1.8)
TSH SERPL-ACNC: 2.4 MIU/L (ref 0.4–4.5)
WBC # BLD AUTO: 7.3 THOUSAND/UL (ref 3.8–10.8)

## 2024-04-17 ENCOUNTER — ANNUAL EXAM (OUTPATIENT)
Dept: OBGYN CLINIC | Facility: CLINIC | Age: 59
End: 2024-04-17
Payer: MEDICARE

## 2024-04-17 VITALS
BODY MASS INDEX: 21.34 KG/M2 | HEIGHT: 61 IN | WEIGHT: 113 LBS | SYSTOLIC BLOOD PRESSURE: 118 MMHG | DIASTOLIC BLOOD PRESSURE: 72 MMHG

## 2024-04-17 DIAGNOSIS — Z01.419 ENCOUNTER FOR ANNUAL ROUTINE GYNECOLOGICAL EXAMINATION: Primary | ICD-10-CM

## 2024-04-17 DIAGNOSIS — Z12.4 CERVICAL CANCER SCREENING: ICD-10-CM

## 2024-04-17 DIAGNOSIS — R92.343 EXTREMELY DENSE TISSUE OF BOTH BREASTS ON MAMMOGRAPHY: ICD-10-CM

## 2024-04-17 DIAGNOSIS — Z12.31 ENCOUNTER FOR SCREENING MAMMOGRAM FOR MALIGNANT NEOPLASM OF BREAST: ICD-10-CM

## 2024-04-17 DIAGNOSIS — N95.1 MENOPAUSAL SYMPTOMS: ICD-10-CM

## 2024-04-17 PROCEDURE — G0101 CA SCREEN;PELVIC/BREAST EXAM: HCPCS | Performed by: OBSTETRICS & GYNECOLOGY

## 2024-04-17 NOTE — PROGRESS NOTES
Annual Wellness Visit  [facility] - [address], [address], [place] [address]    ASSESSMENT/PLAN: Suzanne Vasquez is a 59 y.o.  who presents for annual gynecologic exam.    Encounter for routine gynecologic examination  - Routine well woman exam completed today.  - Cervical Cancer Screening: Current ASCCP Guidelines reviewed. Last Pap: 2/15/2021 normal. Past abnormal pap: no abnl.  Next Pap Due: today.  - Contraceptive counseling discussed.  Current contraception: abstinence,   - Breast Cancer Screening: Last Mammogram 2023, normal  - Colorectal cancer screening last 2022, next due .  - The following were reviewed in today's visit: mammography screening ordered, menopause, exercise, and healthy diet    Additional problems addressed during this visit:  1. Encounter for annual routine gynecological examination    2. Encounter for screening mammogram for malignant neoplasm of breast  -     Mammo screening bilateral w 3d & cad; Future    3. Extremely dense tissue of both breasts on mammography  Assessment & Plan:  Patient continues to have extremely dense breasts on mammogram.  Annual u/s recommended with mammogram.  Order provided.    Orders:  -     US breast screening bilateral complete (ABUS); Future    4. Cervical cancer screening  -     Thinprep Tis Pap and HPV mRNA E6/E7 Reflex HPV 16,18/45    5. Menopausal symptoms  Assessment & Plan:  Some hot flashes.  She states random.  Not too bad.          Next visit: 1 year Wellness      CC:  Annual Gynecologic Examination    HPI: Suzanne Vasquez is a 59 y.o.  who presents for annual gynecologic examination.  Suzanne presents today with Sergey from [employer].  He is available for history and questions but not in the room for exam.  She denies any breast, urinary or pelvic issues at today's visit.    Periods stopped about 2 years ago.  No bleeding since.  Some hot flashes.   Not sexually active.     Sergey separately confirms no  concerns.        Gyn History  No LMP recorded. Patient is postmenopausal.     Last Pap: 2021 normal pap    She  reports never being sexually active.       OB History      Past Medical History:  No date: Anxiety  No date: Asthma      Comment:  symptoms when had a cat  No date: History of hyperprolactinemia      Comment:  resolved, continues to follow with endocrinology  No date: History of pituitary adenoma      Comment:  resolved on  MRI  No date: Hyperlipidemia  No date: Hypertension  No date: Mild intellectual disabilities  No date: Osteopenia  No date: Rosacea     No past surgical history on file.     Family History   Problem Relation Age of Onset    Cancer Mother     Cancer Father     No Known Problems Sister         Social History     Tobacco Use    Smoking status: Never    Smokeless tobacco: Never   Vaping Use    Vaping status: Never Used   Substance Use Topics    Alcohol use: Never    Drug use: Never          Current Outpatient Medications:     albuterol (ACCUNEB) 1.25 MG/3ML nebulizer solution, Take 1 ampule by nebulization every 6 (six) hours as needed for wheezing, Disp: , Rfl:     Carboxymethylcellul-Glycerin (Refresh Optive) 0.5-0.9 % SOLN, Apply to eye, Disp: , Rfl:     Cholecalciferol (VITAMIN D3) 5000 units CAPS, Take by mouth daily , Disp: , Rfl:     docusate sodium (COLACE) 100 mg capsule, Take 100 mg by mouth in the morning, Disp: , Rfl:     FLUoxetine (PROzac) 20 mg capsule, 1 capsule, Disp: , Rfl:     FLUoxetine (PROzac) 40 MG capsule, 1 capsule, Disp: , Rfl:     lisinopril (ZESTRIL) 10 mg tablet, Take 10 mg by mouth daily, Disp: , Rfl:     montelukast (SINGULAIR) 10 mg tablet, Take 10 mg by mouth daily at bedtime, Disp: , Rfl:     simvastatin (ZOCOR) 40 mg tablet, Take 40 mg by mouth daily at bedtime, Disp: , Rfl:     She is allergic to dust mite extract..    ROS negative except as noted in HPI    Objective:  /72 (BP Location: Left arm, Patient Position: Sitting, Cuff Size:  "Standard)   Ht 5' 1\" (1.549 m)   Wt 51.3 kg (113 lb)   BMI 21.35 kg/m²      Physical Exam  Exam conducted with a chaperone present (Aliya Lebron MA).   Constitutional:       Appearance: Normal appearance.   Chest:   Breasts:     Right: Normal. No mass or tenderness.      Left: Normal. No mass or tenderness.   Abdominal:      Palpations: Abdomen is soft.      Tenderness: There is no abdominal tenderness.   Genitourinary:     General: Normal vulva.      Vagina: No bleeding or lesions.      Cervix: Normal.      Uterus: Normal. Not tender.       Adnexa:         Right: No mass or tenderness.          Left: No mass or tenderness.        Rectum: No external hemorrhoid.      Comments: Atrophic changes appropriate to age; Spec exam done with peds speculum - very narrow introitus, limited exam - no abnl - cannot see cervix entirely or palpate cervix due to discomfort.  Blind pap done today  Musculoskeletal:         General: Normal range of motion.   Lymphadenopathy:      Upper Body:      Right upper body: No axillary adenopathy.      Left upper body: No axillary adenopathy.   Neurological:      Mental Status: She is alert and oriented to person, place, and time.   Psychiatric:         Mood and Affect: Mood normal.         Behavior: Behavior normal.         "

## 2024-04-17 NOTE — LETTER
2024     Betty Garcia MD  [address]  [facility] [address]    Patient: Suzanne Vasquez   YOB: 1965   Date of Visit: 2024       Dear Dr. Garcia:    Thank you for referring Suzanne Vasquez to me for evaluation. Below are my notes for this consultation.    If you have questions, please do not hesitate to call me. I look forward to following your patient along with you.         Sincerely,        Bharti Gomes MD        CC: No Recipients    Bharti Gomes MD  2024  1:22 PM  Sign when Signing Visit  Annual Wellness Visit  [facility] - [address], [address], [place] [address]    ASSESSMENT/PLAN: Suzanne Vasquez is a 59 y.o.  who presents for annual gynecologic exam.    Encounter for routine gynecologic examination  - Routine well woman exam completed today.  - Cervical Cancer Screening: Current ASCCP Guidelines reviewed. Last Pap: 2/15/2021 normal. Past abnormal pap: no abnl.  Next Pap Due: today.  - Contraceptive counseling discussed.  Current contraception: abstinence,   - Breast Cancer Screening: Last Mammogram 2023, normal  - Colorectal cancer screening last 2022, next due .  - The following were reviewed in today's visit: mammography screening ordered, menopause, exercise, and healthy diet    Additional problems addressed during this visit:  1. Encounter for annual routine gynecological examination    2. Encounter for screening mammogram for malignant neoplasm of breast  -     Mammo screening bilateral w 3d & cad; Future    3. Extremely dense tissue of both breasts on mammography  Assessment & Plan:  Patient continues to have extremely dense breasts on mammogram.  Annual u/s recommended with mammogram.  Order provided.    Orders:  -     US breast screening bilateral complete (ABUS); Future    4. Cervical cancer screening  -     Thinprep Tis Pap and HPV mRNA E6/E7 Reflex HPV 16,18/45    5. Menopausal symptoms  Assessment & Plan:  Some hot flashes.  She  states random.  Not too bad.          Next visit: 1 year Wellness      CC:  Annual Gynecologic Examination    HPI: Suzanne Vasquez is a 59 y.o.  who presents for annual gynecologic examination.  Suzanne presents today with Sergey from [employer].  He is available for history and questions but not in the room for exam.  She denies any breast, urinary or pelvic issues at today's visit.    Periods stopped about 2 years ago.  No bleeding since.  Some hot flashes.   Not sexually active.     Sergey separately confirms no concerns.        Gyn History  No LMP recorded. Patient is postmenopausal.     Last Pap: 2021 normal pap    She  reports never being sexually active.       OB History      Past Medical History:  No date: Anxiety  No date: Asthma      Comment:  symptoms when had a cat  No date: History of hyperprolactinemia      Comment:  resolved, continues to follow with endocrinology  No date: History of pituitary adenoma      Comment:  resolved on  MRI  No date: Hyperlipidemia  No date: Hypertension  No date: Mild intellectual disabilities  No date: Osteopenia  No date: Rosacea     No past surgical history on file.     Family History   Problem Relation Age of Onset   • Cancer Mother    • Cancer Father    • No Known Problems Sister         Social History     Tobacco Use   • Smoking status: Never   • Smokeless tobacco: Never   Vaping Use   • Vaping status: Never Used   Substance Use Topics   • Alcohol use: Never   • Drug use: Never          Current Outpatient Medications:   •  albuterol (ACCUNEB) 1.25 MG/3ML nebulizer solution, Take 1 ampule by nebulization every 6 (six) hours as needed for wheezing, Disp: , Rfl:   •  Carboxymethylcellul-Glycerin (Refresh Optive) 0.5-0.9 % SOLN, Apply to eye, Disp: , Rfl:   •  Cholecalciferol (VITAMIN D3) 5000 units CAPS, Take by mouth daily , Disp: , Rfl:   •  docusate sodium (COLACE) 100 mg capsule, Take 100 mg by mouth in the morning, Disp: , Rfl:   •  FLUoxetine (PROzac) 20 mg  "capsule, 1 capsule, Disp: , Rfl:   •  FLUoxetine (PROzac) 40 MG capsule, 1 capsule, Disp: , Rfl:   •  lisinopril (ZESTRIL) 10 mg tablet, Take 10 mg by mouth daily, Disp: , Rfl:   •  montelukast (SINGULAIR) 10 mg tablet, Take 10 mg by mouth daily at bedtime, Disp: , Rfl:   •  simvastatin (ZOCOR) 40 mg tablet, Take 40 mg by mouth daily at bedtime, Disp: , Rfl:     She is allergic to dust mite extract..    ROS negative except as noted in HPI    Objective:  /72 (BP Location: Left arm, Patient Position: Sitting, Cuff Size: Standard)   Ht 5' 1\" (1.549 m)   Wt 51.3 kg (113 lb)   BMI 21.35 kg/m²      Physical Exam  Constitutional:       Appearance: Normal appearance.   Chest:   Breasts:     Right: Normal. No mass or tenderness.      Left: Normal. No mass or tenderness.   Abdominal:      Palpations: Abdomen is soft.      Tenderness: There is no abdominal tenderness.   Genitourinary:     General: Normal vulva.      Vagina: No bleeding or lesions.      Cervix: Normal.      Uterus: Normal. Not tender.       Adnexa:         Right: No mass or tenderness.          Left: No mass or tenderness.        Rectum: No external hemorrhoid.      Comments: Atrophic changes appropriate to age; Spec exam done with peds speculum - very narrow introitus, limited exam - no abnl - cannot see cervix entirely or palpate cervix due to discomfort.  Blind pap done today  Musculoskeletal:         General: Normal range of motion.   Lymphadenopathy:      Upper Body:      Right upper body: No axillary adenopathy.      Left upper body: No axillary adenopathy.   Neurological:      Mental Status: She is alert and oriented to person, place, and time.   Psychiatric:         Mood and Affect: Mood normal.         Behavior: Behavior normal.         "

## 2024-04-17 NOTE — ASSESSMENT & PLAN NOTE
Patient continues to have extremely dense breasts on mammogram.  Annual u/s recommended with mammogram.  Order provided.

## 2024-04-19 LAB
CLINICAL INFO: NORMAL
CYTO CVX: NORMAL
DATE PREVIOUS BX: NORMAL
HPV E6+E7 MRNA CVX QL NAA+PROBE: NOT DETECTED
LMP START DATE: NORMAL
SL AMB PREV. PAP:: NORMAL
SPECIMEN SOURCE CVX/VAG CYTO: NORMAL
STAT OF ADQ CVX/VAG CYTO-IMP: NORMAL

## 2024-08-29 ENCOUNTER — TELEPHONE (OUTPATIENT)
Dept: NEUROLOGY | Facility: CLINIC | Age: 59
End: 2024-08-29

## 2024-11-27 ENCOUNTER — TELEPHONE (OUTPATIENT)
Age: 59
End: 2024-11-27

## 2024-11-27 DIAGNOSIS — F41.9 ANXIETY: ICD-10-CM

## 2024-11-27 DIAGNOSIS — E22.1 HYPERPROLACTINEMIA (HCC): ICD-10-CM

## 2024-11-27 DIAGNOSIS — Z87.898 HISTORY OF PITUITARY TUMOR: Primary | ICD-10-CM

## 2024-11-27 NOTE — TELEPHONE ENCOUNTER
It should not have anything to due with her pituitary but we can do the blood work early just to be sure.

## 2024-11-27 NOTE — TELEPHONE ENCOUNTER
Sergey from [facility] calling with some concerns they have been having about patient.  He reports that she has been having more anxiety and forgetfulness and he wanted to check with Dr Cota if she feels this could have anything to day with her pituitary. Please advise and call Sergey.

## 2024-12-20 LAB
ALBUMIN SERPL-MCNC: 4 G/DL (ref 3.6–5.1)
ALBUMIN/GLOB SERPL: 1.3 (CALC) (ref 1–2.5)
ALP SERPL-CCNC: 98 U/L (ref 37–153)
ALT SERPL-CCNC: 15 U/L (ref 6–29)
AST SERPL-CCNC: 20 U/L (ref 10–35)
BASOPHILS # BLD AUTO: 62 CELLS/UL (ref 0–200)
BASOPHILS NFR BLD AUTO: 0.9 %
BILIRUB SERPL-MCNC: 0.4 MG/DL (ref 0.2–1.2)
BUN SERPL-MCNC: 18 MG/DL (ref 7–25)
BUN/CREAT SERPL: NORMAL (CALC) (ref 6–22)
CALCIUM SERPL-MCNC: 9.6 MG/DL (ref 8.6–10.4)
CHLORIDE SERPL-SCNC: 103 MMOL/L (ref 98–110)
CO2 SERPL-SCNC: 28 MMOL/L (ref 20–32)
CREAT SERPL-MCNC: 0.95 MG/DL (ref 0.5–1.03)
EOSINOPHIL # BLD AUTO: 221 CELLS/UL (ref 15–500)
EOSINOPHIL NFR BLD AUTO: 3.2 %
ERYTHROCYTE [DISTWIDTH] IN BLOOD BY AUTOMATED COUNT: 12.6 % (ref 11–15)
GFR/BSA.PRED SERPLBLD CYS-BASED-ARV: 70 ML/MIN/1.73M2
GLOBULIN SER CALC-MCNC: 3 G/DL (CALC) (ref 1.9–3.7)
GLUCOSE SERPL-MCNC: 87 MG/DL (ref 65–99)
HCT VFR BLD AUTO: 39.8 % (ref 35–45)
HGB BLD-MCNC: 12.8 G/DL (ref 11.7–15.5)
IGF-I SERPL-MCNC: 93 NG/ML (ref 50–317)
IGF-I Z-SCORE SERPL: -0.8 SD
LYMPHOCYTES # BLD AUTO: 1780 CELLS/UL (ref 850–3900)
LYMPHOCYTES NFR BLD AUTO: 25.8 %
MCH RBC QN AUTO: 28.8 PG (ref 27–33)
MCHC RBC AUTO-ENTMCNC: 32.2 G/DL (ref 32–36)
MCV RBC AUTO: 89.6 FL (ref 80–100)
MONOCYTES # BLD AUTO: 552 CELLS/UL (ref 200–950)
MONOCYTES NFR BLD AUTO: 8 %
NEUTROPHILS # BLD AUTO: 4285 CELLS/UL (ref 1500–7800)
NEUTROPHILS NFR BLD AUTO: 62.1 %
PLATELET # BLD AUTO: 229 THOUSAND/UL (ref 140–400)
PMV BLD REES-ECKER: 12 FL (ref 7.5–12.5)
POTASSIUM SERPL-SCNC: 4.5 MMOL/L (ref 3.5–5.3)
PROLACTIN SERPL-MCNC: 8.8 NG/ML
PROT SERPL-MCNC: 7 G/DL (ref 6.1–8.1)
RBC # BLD AUTO: 4.44 MILLION/UL (ref 3.8–5.1)
SODIUM SERPL-SCNC: 139 MMOL/L (ref 135–146)
T4 FREE SERPL-MCNC: 0.9 NG/DL (ref 0.8–1.8)
TSH SERPL-ACNC: 2.9 MIU/L (ref 0.4–4.5)
WBC # BLD AUTO: 6.9 THOUSAND/UL (ref 3.8–10.8)

## 2024-12-26 ENCOUNTER — RESULTS FOLLOW-UP (OUTPATIENT)
Dept: ENDOCRINOLOGY | Facility: HOSPITAL | Age: 59
End: 2024-12-26

## 2025-02-03 ENCOUNTER — HOSPITAL ENCOUNTER (OUTPATIENT)
Dept: RADIOLOGY | Age: 60
Discharge: HOME | End: 2025-02-03
Payer: MEDICARE

## 2025-02-03 VITALS — WEIGHT: 113.1 LBS | HEIGHT: 61 IN | BODY MASS INDEX: 21.35 KG/M2

## 2025-02-03 DIAGNOSIS — Z12.39 ENCOUNTER FOR BREAST CANCER SCREENING OTHER THAN MAMMOGRAM: ICD-10-CM

## 2025-02-03 DIAGNOSIS — Z12.31 ENCOUNTER FOR SCREENING MAMMOGRAM FOR MALIGNANT NEOPLASM OF BREAST: ICD-10-CM

## 2025-02-03 DIAGNOSIS — R92.343 EXTREMELY DENSE TISSUE OF BOTH BREASTS ON MAMMOGRAPHY: ICD-10-CM

## 2025-02-03 PROCEDURE — 76641 ULTRASOUND BREAST COMPLETE: CPT

## 2025-02-03 PROCEDURE — 77063 BREAST TOMOSYNTHESIS BI: CPT

## 2025-02-03 PROCEDURE — 77067 SCR MAMMO BI INCL CAD: CPT

## 2025-02-06 ENCOUNTER — RESULTS FOLLOW-UP (OUTPATIENT)
Dept: OBGYN CLINIC | Facility: CLINIC | Age: 60
End: 2025-02-06

## 2025-03-04 ENCOUNTER — RESULTS FOLLOW-UP (OUTPATIENT)
Dept: ENDOCRINOLOGY | Facility: HOSPITAL | Age: 60
End: 2025-03-04

## 2025-03-08 LAB
ALBUMIN SERPL-MCNC: 4 G/DL (ref 3.6–5.1)
ALBUMIN/GLOB SERPL: 1.4 (CALC) (ref 1–2.5)
ALP SERPL-CCNC: 101 U/L (ref 37–153)
ALT SERPL-CCNC: 12 U/L (ref 6–29)
AST SERPL-CCNC: 18 U/L (ref 10–35)
BASOPHILS # BLD AUTO: 53 CELLS/UL (ref 0–200)
BASOPHILS NFR BLD AUTO: 0.7 %
BILIRUB SERPL-MCNC: 0.4 MG/DL (ref 0.2–1.2)
BUN SERPL-MCNC: 17 MG/DL (ref 7–25)
BUN/CREAT SERPL: NORMAL (CALC) (ref 6–22)
CALCIUM SERPL-MCNC: 9.3 MG/DL (ref 8.6–10.4)
CHLORIDE SERPL-SCNC: 104 MMOL/L (ref 98–110)
CO2 SERPL-SCNC: 29 MMOL/L (ref 20–32)
CREAT SERPL-MCNC: 0.86 MG/DL (ref 0.5–1.03)
EOSINOPHIL # BLD AUTO: 91 CELLS/UL (ref 15–500)
EOSINOPHIL NFR BLD AUTO: 1.2 %
ERYTHROCYTE [DISTWIDTH] IN BLOOD BY AUTOMATED COUNT: 13 % (ref 11–15)
GFR/BSA.PRED SERPLBLD CYS-BASED-ARV: 78 ML/MIN/1.73M2
GLOBULIN SER CALC-MCNC: 2.9 G/DL (CALC) (ref 1.9–3.7)
GLUCOSE SERPL-MCNC: 79 MG/DL (ref 65–99)
HCT VFR BLD AUTO: 40 % (ref 35–45)
HGB BLD-MCNC: 12.9 G/DL (ref 11.7–15.5)
IGF-I SERPL-MCNC: 64 NG/ML (ref 50–317)
IGF-I Z-SCORE SERPL: -1.4 SD
LYMPHOCYTES # BLD AUTO: 1520 CELLS/UL (ref 850–3900)
LYMPHOCYTES NFR BLD AUTO: 20 %
MCH RBC QN AUTO: 28.7 PG (ref 27–33)
MCHC RBC AUTO-ENTMCNC: 32.3 G/DL (ref 32–36)
MCV RBC AUTO: 88.9 FL (ref 80–100)
MONOCYTES # BLD AUTO: 646 CELLS/UL (ref 200–950)
MONOCYTES NFR BLD AUTO: 8.5 %
NEUTROPHILS # BLD AUTO: 5290 CELLS/UL (ref 1500–7800)
NEUTROPHILS NFR BLD AUTO: 69.6 %
PLATELET # BLD AUTO: 213 THOUSAND/UL (ref 140–400)
PMV BLD REES-ECKER: 12.2 FL (ref 7.5–12.5)
POTASSIUM SERPL-SCNC: 4.1 MMOL/L (ref 3.5–5.3)
PROLACTIN SERPL-MCNC: 7 NG/ML
PROT SERPL-MCNC: 6.9 G/DL (ref 6.1–8.1)
RBC # BLD AUTO: 4.5 MILLION/UL (ref 3.8–5.1)
SODIUM SERPL-SCNC: 140 MMOL/L (ref 135–146)
T4 FREE SERPL-MCNC: 0.9 NG/DL (ref 0.8–1.8)
TSH SERPL-ACNC: 1.85 MIU/L (ref 0.4–4.5)
WBC # BLD AUTO: 7.6 THOUSAND/UL (ref 3.8–10.8)

## 2025-03-18 ENCOUNTER — OFFICE VISIT (OUTPATIENT)
Dept: ENDOCRINOLOGY | Facility: HOSPITAL | Age: 60
End: 2025-03-18
Payer: MEDICARE

## 2025-03-18 VITALS
WEIGHT: 112.2 LBS | HEIGHT: 61 IN | SYSTOLIC BLOOD PRESSURE: 118 MMHG | HEART RATE: 73 BPM | BODY MASS INDEX: 21.18 KG/M2 | DIASTOLIC BLOOD PRESSURE: 80 MMHG

## 2025-03-18 DIAGNOSIS — Z87.898 HISTORY OF PITUITARY TUMOR: ICD-10-CM

## 2025-03-18 DIAGNOSIS — E22.1 HYPERPROLACTINEMIA (HCC): Primary | ICD-10-CM

## 2025-03-18 PROCEDURE — G2211 COMPLEX E/M VISIT ADD ON: HCPCS | Performed by: INTERNAL MEDICINE

## 2025-03-18 PROCEDURE — 99214 OFFICE O/P EST MOD 30 MIN: CPT | Performed by: INTERNAL MEDICINE

## 2025-03-18 RX ORDER — ALENDRONATE SODIUM 70 MG/1
70 TABLET ORAL
COMMUNITY
Start: 2025-02-14

## 2025-03-18 NOTE — ASSESSMENT & PLAN NOTE
Most recent prolactin level is normal.  She has no galactorrhea.  Her prolactin level will continue to be followed over time.    Orders:    Comprehensive metabolic panel; Future    CBC and differential; Future    T4, free; Future    Prolactin; Future    TSH, 3rd generation; Future    Insulin-like growth factor 1 (IGF-1); Future

## 2025-03-18 NOTE — PROGRESS NOTES
Name: Suzanne Vasquez      : 1965      MRN: [id number]  Encounter Provider: Lindsey Cota MD  Encounter Date: 3/18/2025   Encounter department[facility]    No chief complaint on file.  :  Assessment & Plan  Hyperprolactinemia (HCC)    Most recent prolactin level is normal.  She has no galactorrhea.  Her prolactin level will continue to be followed over time.    Orders:    Comprehensive metabolic panel; Future    CBC and differential; Future    T4, free; Future    Prolactin; Future    TSH, 3rd generation; Future    Insulin-like growth factor 1 (IGF-1); Future    History of pituitary tumor    Pituitary function studies are normal.  Her pituitary function will be studied on a yearly basis.  No repeat MRI needs to be performed unless something changes with her hormonal status.    Orders:    Comprehensive metabolic panel; Future    CBC and differential; Future    T4, free; Future    Prolactin; Future    TSH, 3rd generation; Future    Insulin-like growth factor 1 (IGF-1); Future      I have asked her to follow-up in 1 year with preceding TSH, free T4, CMP, CBC, IGF-I level, and prolactin.      History of Present Illness   History of Present Illness    Suzanne Vasquez is a 60 y.o. female with a history of hyperprolactinemia and pituitary adenoma here for follow-up visit.    Pertinent Medical History   She was found to have hyperprolactinemia in 2019 and pituitary microadenoma was noted on the workup via MRI.  Follow-up MRI in  showed resolution of her pituitary tumor.  At 1 point, she was on cabergoline to control her prolactin but this was discontinued in 2017 when her pituitary tumor resolved on MRI.  Prolactin has been followed over time and has remained normal.        Review of Systems   Constitutional:  Negative for fatigue and unexpected weight change.        Will take a lot of naps as part of her routine.    HENT:  Negative for trouble swallowing.    Eyes:   Negative for visual disturbance.        No loss of peripheral vision. Wears glasses.    Respiratory:  Negative for shortness of breath.    Cardiovascular:  Negative for chest pain and palpitations.   Gastrointestinal:  Negative for abdominal pain, constipation, diarrhea and nausea.   Endocrine: Negative for cold intolerance and heat intolerance.        Some sweating in the am at times.    Genitourinary:         No galactorrhea.    Skin:  Negative for rash and wound.        No dry skin. No brittle nails. No hair loss.    Neurological:  Negative for dizziness, tremors, light-headedness and headaches.   Psychiatric/Behavioral:  Negative for sleep disturbance.         Had anxiety transiently in nov 2024 that has since resolved.     as per HPI  Medical History Reviewed by provider this encounter:     .  Current Outpatient Medications on File Prior to Visit   Medication Sig Dispense Refill    albuterol (ACCUNEB) 1.25 MG/3ML nebulizer solution Take 1 ampule by nebulization every 6 (six) hours as needed for wheezing      alendronate (FOSAMAX) 70 mg tablet Take 70 mg by mouth every 7 days      Carboxymethylcellul-Glycerin (Refresh Optive) 0.5-0.9 % SOLN Apply to eye      Cholecalciferol (VITAMIN D3) 5000 units CAPS Take by mouth daily       docusate sodium (COLACE) 100 mg capsule Take 100 mg by mouth in the morning      FLUoxetine (PROzac) 20 mg capsule Take 20 mg by mouth daily      FLUoxetine (PROzac) 40 MG capsule Take 40 mg by mouth daily      lisinopril (ZESTRIL) 10 mg tablet Take 10 mg by mouth daily      montelukast (SINGULAIR) 10 mg tablet Take 10 mg by mouth daily at bedtime      simvastatin (ZOCOR) 40 mg tablet Take 40 mg by mouth daily at bedtime       No current facility-administered medications on file prior to visit.      Social History     Tobacco Use    Smoking status: Never    Smokeless tobacco: Never   Vaping Use    Vaping status: Never Used   Substance and Sexual Activity    Alcohol use: Never    Drug use:  "Never    Sexual activity: Never        Medical History Reviewed by provider this encounter:     .    Objective   /80   Pulse 73   Ht 5' 1\" (1.549 m)   Wt 50.9 kg (112 lb 3.2 oz)   BMI 21.20 kg/m²      Body mass index is 21.2 kg/m².  Wt Readings from Last 3 Encounters:   03/18/25 50.9 kg (112 lb 3.2 oz)   02/03/25 51.3 kg (113 lb 1.5 oz)   04/17/24 51.3 kg (113 lb)     Physical Exam  Vitals and nursing note reviewed.   Constitutional:       General: She is not in acute distress.     Appearance: Normal appearance. She is well-developed.   HENT:      Head: Normocephalic and atraumatic.   Eyes:      Conjunctiva/sclera: Conjunctivae normal.      Comments: No lid lag, stare, proptosis, or periorbital edema.   Neck:      Vascular: No carotid bruit.      Comments: Thyroid normal in size.  No palpable thyroid nodules.  Cardiovascular:      Rate and Rhythm: Normal rate and regular rhythm.      Heart sounds: No murmur heard.  Pulmonary:      Effort: Pulmonary effort is normal. No respiratory distress.      Breath sounds: Normal breath sounds.   Abdominal:      Palpations: Abdomen is soft.   Musculoskeletal:         General: No swelling.      Cervical back: Neck supple.      Right lower leg: No edema.      Left lower leg: No edema.      Comments: No tremor of the outstretched hands.   Lymphadenopathy:      Cervical: No cervical adenopathy.   Skin:     General: Skin is warm and dry.      Capillary Refill: Capillary refill takes less than 2 seconds.   Neurological:      Mental Status: She is alert.      Comments: Patellar deep tendon reflexes brisk.   Psychiatric:         Mood and Affect: Mood normal.       Physical Exam      Results    Labs:     Blood work performed on 3/3/2025 showed a TSH of 1.85 with a free T4 of 0.9.    Prolactin is 7.    CBC is normal.    IGF-I level is 64.    CMP showed glucose of 79 fasting, sodium 140 with a potassium of 4.1 but was otherwise normal.    Lab Results   Component Value Date    " CREATININE 0.86 03/03/2025    CREATININE 0.95 12/16/2024    CREATININE 0.89 04/01/2024    BUN 17 03/03/2025    K 4.1 03/03/2025     03/03/2025    CO2 29 03/03/2025     eGFR   Date Value Ref Range Status   03/03/2025 78 > OR = 60 mL/min/1.73m2 Final       Lab Results   Component Value Date    ALT 12 03/03/2025    AST 18 03/03/2025    ALKPHOS 101 03/03/2025       Lab Results   Component Value Date    FREET4 0.9 03/03/2025       Patient Instructions   The pituitary blood work is all normal.     We'll continue to follow you over time.     Follow up in 1 year with blood work.     Discussed with the patient and all questioned fully answered. She will call me if any problems arise.

## 2025-03-18 NOTE — PATIENT INSTRUCTIONS
The pituitary blood work is all normal.     We'll continue to follow you over time.     Follow up in 1 year with blood work.

## 2025-03-18 NOTE — ASSESSMENT & PLAN NOTE
Pituitary function studies are normal.  Her pituitary function will be studied on a yearly basis.  No repeat MRI needs to be performed unless something changes with her hormonal status.    Orders:    Comprehensive metabolic panel; Future    CBC and differential; Future    T4, free; Future    Prolactin; Future    TSH, 3rd generation; Future    Insulin-like growth factor 1 (IGF-1); Future      I have asked her to follow-up in 1 year with preceding TSH, free T4, CMP, CBC, IGF-I level, and prolactin.

## 2025-05-21 ENCOUNTER — OFFICE VISIT (OUTPATIENT)
Dept: OBGYN CLINIC | Facility: CLINIC | Age: 60
End: 2025-05-21
Payer: MEDICARE

## 2025-05-21 VITALS
HEIGHT: 61 IN | WEIGHT: 113 LBS | SYSTOLIC BLOOD PRESSURE: 116 MMHG | BODY MASS INDEX: 21.34 KG/M2 | DIASTOLIC BLOOD PRESSURE: 62 MMHG

## 2025-05-21 DIAGNOSIS — R92.343 EXTREMELY DENSE TISSUE OF BOTH BREASTS ON MAMMOGRAPHY: ICD-10-CM

## 2025-05-21 DIAGNOSIS — Z01.419 ENCOUNTER FOR ANNUAL ROUTINE GYNECOLOGICAL EXAMINATION: Primary | ICD-10-CM

## 2025-05-21 DIAGNOSIS — Z12.31 ENCOUNTER FOR SCREENING MAMMOGRAM FOR MALIGNANT NEOPLASM OF BREAST: ICD-10-CM

## 2025-05-21 PROCEDURE — G0101 CA SCREEN;PELVIC/BREAST EXAM: HCPCS | Performed by: OBSTETRICS & GYNECOLOGY

## 2025-05-21 RX ORDER — ACETAMINOPHEN 500 MG
500 TABLET ORAL EVERY 6 HOURS PRN
COMMUNITY

## 2025-05-21 NOTE — PROGRESS NOTES
Annual Wellness Visit  [facility] - [address], [address], [place] [address]    ASSESSMENT/PLAN: Suzanne Vasquez is a 60 y.o.  who presents for annual gynecologic exam.    Assessment & Plan  Encounter for annual routine gynecological examination  - Routine well woman exam completed today.  - Cervical Cancer Screening: Current ASCCP Guidelines reviewed. Last Pap: 2024 normal. Past abnormal pap: none.  Next Pap Due: 2-4 years.  - Contraceptive counseling discussed.  Current contraception: postmenopausal, abstinent  - Breast Cancer Screening: Last Mammogram 2025, normal  - Colorectal cancer screening last , next due .  - The following were reviewed in today's visit: mammography screening ordered, exercise, and healthy diet       Encounter for screening mammogram for malignant neoplasm of breast    Orders:    Mammo screening bilateral w 3d and cad; Future    Extremely dense tissue of both breasts on mammography  As previously reviewed - last mammogram patient had shows dense breasts with >75% breast density.  She has been getting annual ultrasound of breast in addition to mammograms.  Discussed how breast density can sometimes limit ability of mammogram to detect breast cancer.  Additional screening ultrasound discussed, including pros and cons.  After discussion today, pt agrees to order for screening breast ultrasound to have done with mammogram this year.  Encouraged to check insurance coverage.     Orders:    US breast screening bilateral complete (ABUS); Future      Next visit: 1 year Wellness      CC:  Annual Gynecologic Examination    HPI: Suzanne Vasquez is a 60 y.o.  who presents for annual gynecologic examination.  Suzanne presents for gyn Wellness exam today.  Kaykay, [employer] representative, is with her today.  Kaykay is in room for history and behind curtain for physical exam with Suzanne's consent.    She denies any breast, urinary or pelvic issues at today's  "visit.  Kaykay conctanika    Periods stopped few years ago.  No bleeding since.  Some hot flashes but lessening with time.  Not sexually active.           Gyn History  No LMP recorded. Patient is postmenopausal.     Last Pap: 2024 was normal    She  reports never being sexually active.       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    ROS negative except as noted in HPI    Objective:  /62 (BP Location: Left arm, Patient Position: Sitting, Cuff Size: Standard)   Ht 5' 1\" (1.549 m)   Wt 51.3 kg (113 lb)   BMI 21.35 kg/m²      Physical Exam  Constitutional:       Appearance: Normal appearance.   Chest:   Breasts:     Right: Normal. No mass or tenderness.      Left: Normal. No mass or tenderness.   Abdominal:      Palpations: Abdomen is soft.      Tenderness: There is no abdominal tenderness.   Genitourinary:     General: Normal vulva.      Vagina: No bleeding or lesions.      Cervix: Normal.      Uterus: Normal. Not tender.       Adnexa:         Right: No mass or tenderness.          Left: No mass or tenderness.        Rectum: No external hemorrhoid.      Comments: Atrophic changes appropriate to age; peds spec used and limited visualization of vagina and cervix due to patient discomfort; no blood or discharge noted    Musculoskeletal:         General: Normal " range of motion.   Lymphadenopathy:      Upper Body:      Right upper body: No axillary adenopathy.      Left upper body: No axillary adenopathy.     Neurological:      Mental Status: She is alert and oriented to person, place, and time.     Psychiatric:         Mood and Affect: Mood normal.         Behavior: Behavior normal.              [1]   Social History  Tobacco Use    Smoking status: Never    Smokeless tobacco: Never   Vaping Use    Vaping status: Never Used   Substance Use Topics    Alcohol use: Never    Drug use: Never   [2]   Current Outpatient Medications:     acetaminophen (TYLENOL) 500 mg tablet, Take 500 mg by mouth every 6 (six) hours as needed for mild pain, Disp: , Rfl:     albuterol (ACCUNEB) 1.25 MG/3ML nebulizer solution, Take 1 ampule by nebulization every 6 (six) hours as needed for wheezing, Disp: , Rfl:     alendronate (FOSAMAX) 70 mg tablet, Take 70 mg by mouth every 7 days, Disp: , Rfl:     Carboxymethylcellul-Glycerin (Refresh Optive) 0.5-0.9 % SOLN, Apply to eye, Disp: , Rfl:     Cholecalciferol (VITAMIN D3) 5000 units CAPS, Take by mouth in the morning., Disp: , Rfl:     docusate sodium (COLACE) 100 mg capsule, Take 100 mg by mouth in the morning, Disp: , Rfl:     FLUoxetine (PROzac) 20 mg capsule, Take 20 mg by mouth in the morning., Disp: , Rfl:     FLUoxetine (PROzac) 40 MG capsule, Take 40 mg by mouth in the morning., Disp: , Rfl:     guaiFENesin (JERRI-TUSSIN PO), Take 10 mL by mouth if needed, Disp: , Rfl:     lisinopril (ZESTRIL) 10 mg tablet, Take 10 mg by mouth in the morning., Disp: , Rfl:     montelukast (SINGULAIR) 10 mg tablet, Take 10 mg by mouth daily at bedtime, Disp: , Rfl:     simvastatin (ZOCOR) 40 mg tablet, Take 40 mg by mouth daily at bedtime, Disp: , Rfl:

## 2025-05-21 NOTE — ASSESSMENT & PLAN NOTE
As previously reviewed - last mammogram patient had shows dense breasts with >75% breast density.  She has been getting annual ultrasound of breast in addition to mammograms.  Discussed how breast density can sometimes limit ability of mammogram to detect breast cancer.  Additional screening ultrasound discussed, including pros and cons.  After discussion today, pt agrees to order for screening breast ultrasound to have done with mammogram this year.  Encouraged to check insurance coverage.     Orders:    US breast screening bilateral complete (ABUS); Future

## 2025-05-21 NOTE — LETTER
May 21, 2025     Betty Garcia MD  [address]  [facility] [address]    Patient: Suzanne Vasquez   YOB: 1965   Date of Visit: 2025       Dear Dr. Betty Garcia MD:    Thank you for referring Suzanne Vasquez to me for evaluation. Below are my notes for this consultation.    If you have questions, please do not hesitate to call me. I look forward to following your patient along with you.         Sincerely,        Bharti Gomes MD        CC: No Recipients    Bharti Gomes MD  2025  2:06 PM  Sign when Signing Visit  Annual Wellness Visit  [facility] - [address], [address], [place] [address]    ASSESSMENT/PLAN: Suzanne Vasquez is a 60 y.o.  who presents for annual gynecologic exam.    Assessment & Plan  Encounter for annual routine gynecological examination  - Routine well woman exam completed today.  - Cervical Cancer Screening: Current ASCCP Guidelines reviewed. Last Pap: 2024 normal. Past abnormal pap: none.  Next Pap Due: 2-4 years.  - Contraceptive counseling discussed.  Current contraception: postmenopausal, abstinent  - Breast Cancer Screening: Last Mammogram 2025, normal  - Colorectal cancer screening last , next due .  - The following were reviewed in today's visit: mammography screening ordered, exercise, and healthy diet       Encounter for screening mammogram for malignant neoplasm of breast    Orders:  •  Mammo screening bilateral w 3d and cad; Future    Extremely dense tissue of both breasts on mammography  As previously reviewed - last mammogram patient had shows dense breasts with >75% breast density.  She has been getting annual ultrasound of breast in addition to mammograms.  Discussed how breast density can sometimes limit ability of mammogram to detect breast cancer.  Additional screening ultrasound discussed, including pros and cons.  After discussion today, pt agrees to order for screening breast ultrasound to have done with  "mammogram this year.  Encouraged to check insurance coverage.     Orders:  •  US breast screening bilateral complete (ABUS); Future      Next visit: 1 year Wellness      CC:  Annual Gynecologic Examination    HPI: Suzanne Vasquez is a 60 y.o.  who presents for annual gynecologic examination.  Suzanne presents for gyn Wellness exam today.  Kaykay, [employer] representative, is with her today.  Kaykay is in room for history and behind curtain for physical exam with Suzanne's consent.    She denies any breast, urinary or pelvic issues at today's visit.  Kaykay concurs    Periods stopped few years ago.  No bleeding since.  Some hot flashes but lessening with time.  Not sexually active.           Gyn History  No LMP recorded. Patient is postmenopausal.     Last Pap: 2024 was normal    She  reports never being sexually active.       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    ROS negative except as noted in HPI    Objective:  /62 (BP Location: Left arm, Patient Position: Sitting, Cuff Size: Standard)   Ht 5' 1\" (1.549 m)   Wt 51.3 kg (113 lb)   BMI 21.35 kg/m²      Physical Exam  Constitutional:       Appearance: Normal appearance.   Chest:   Breasts:     Right: Normal. No mass or tenderness.      Left: Normal. No mass or tenderness.   Abdominal:      " Palpations: Abdomen is soft.      Tenderness: There is no abdominal tenderness.   Genitourinary:     General: Normal vulva.      Vagina: No bleeding or lesions.      Cervix: Normal.      Uterus: Normal. Not tender.       Adnexa:         Right: No mass or tenderness.          Left: No mass or tenderness.        Rectum: No external hemorrhoid.      Comments: Atrophic changes appropriate to age; peds spec used and limited visualization of vagina and cervix due to patient discomfort; no blood or discharge noted    Musculoskeletal:         General: Normal range of motion.   Lymphadenopathy:      Upper Body:      Right upper body: No axillary adenopathy.      Left upper body: No axillary adenopathy.     Neurological:      Mental Status: She is alert and oriented to person, place, and time.     Psychiatric:         Mood and Affect: Mood normal.         Behavior: Behavior normal.                [1]  Social History  Tobacco Use   • Smoking status: Never   • Smokeless tobacco: Never   Vaping Use   • Vaping status: Never Used   Substance Use Topics   • Alcohol use: Never   • Drug use: Never   [2]    Current Outpatient Medications:   •  acetaminophen (TYLENOL) 500 mg tablet, Take 500 mg by mouth every 6 (six) hours as needed for mild pain, Disp: , Rfl:   •  albuterol (ACCUNEB) 1.25 MG/3ML nebulizer solution, Take 1 ampule by nebulization every 6 (six) hours as needed for wheezing, Disp: , Rfl:   •  alendronate (FOSAMAX) 70 mg tablet, Take 70 mg by mouth every 7 days, Disp: , Rfl:   •  Carboxymethylcellul-Glycerin (Refresh Optive) 0.5-0.9 % SOLN, Apply to eye, Disp: , Rfl:   •  Cholecalciferol (VITAMIN D3) 5000 units CAPS, Take by mouth in the morning., Disp: , Rfl:   •  docusate sodium (COLACE) 100 mg capsule, Take 100 mg by mouth in the morning, Disp: , Rfl:   •  FLUoxetine (PROzac) 20 mg capsule, Take 20 mg by mouth in the morning., Disp: , Rfl:   •  FLUoxetine (PROzac) 40 MG capsule, Take 40 mg by mouth in the morning.,  Disp: , Rfl:   •  guaiFENesin (JERRI-TUSSIN PO), Take 10 mL by mouth if needed, Disp: , Rfl:   •  lisinopril (ZESTRIL) 10 mg tablet, Take 10 mg by mouth in the morning., Disp: , Rfl:   •  montelukast (SINGULAIR) 10 mg tablet, Take 10 mg by mouth daily at bedtime, Disp: , Rfl:   •  simvastatin (ZOCOR) 40 mg tablet, Take 40 mg by mouth daily at bedtime, Disp: , Rfl:

## 2025-05-21 NOTE — LETTER
May 21, 2025     Betty Garcia MD  [address]  [facility] [address]    Patient: Suzanne Vasquez   YOB: 1965   Date of Visit: 2025       Dear Dr. Betty Garcia MD:    Thank you for referring Suzanne Vasquez to me for evaluation. Below are my notes for this consultation.    If you have questions, please do not hesitate to call me. I look forward to following your patient along with you.         Sincerely,        Bharti Gomes MD        CC: No Recipients    Bharti Gomes MD  2025  2:05 PM  Incomplete  Annual Wellness Visit  [facility] - [address], [address], [place] [address]    ASSESSMENT/PLAN: Suzanne Vasquez is a 60 y.o.  who presents for annual gynecologic exam.    Assessment & Plan  Encounter for annual routine gynecological examination  - Routine well woman exam completed today.  - Cervical Cancer Screening: Current ASCCP Guidelines reviewed. Last Pap: 2024 normal. Past abnormal pap: none.  Next Pap Due: 2-4 years.  - Contraceptive counseling discussed.  Current contraception: postmenopausal, abstinent  - Breast Cancer Screening: Last Mammogram 2025, normal  - Colorectal cancer screening last , next due .  - The following were reviewed in today's visit: mammography screening ordered, exercise, and healthy diet       Encounter for screening mammogram for malignant neoplasm of breast    Orders:    Mammo screening bilateral w 3d and cad; Future    Extremely dense tissue of both breasts on mammography  As previously reviewed - last mammogram patient had shows dense breasts with >75% breast density.  She has been getting annual ultrasound of breast in addition to mammograms.  Discussed how breast density can sometimes limit ability of mammogram to detect breast cancer.  Additional screening ultrasound discussed, including pros and cons.  After discussion today, pt agrees to order for screening breast ultrasound to have done with mammogram this year.   "Encouraged to check insurance coverage.     Orders:    US breast screening bilateral complete (ABUS); Future      Next visit: 1 year Wellness      CC:  Annual Gynecologic Examination    HPI: Suzanne Vasquez is a 60 y.o.  who presents for annual gynecologic examination.  Suzanne presents for gyn Wellness exam today.  Kaykay, [employer] representative, is with her today.  Kaykay is in room for history and behind curtain for physical exam with Suzanne's consent.    She denies any breast, urinary or pelvic issues at today's visit.  Kaykay concurs    Periods stopped few years ago.  No bleeding since.  Some hot flashes but lessening with time.  Not sexually active.           Gyn History  No LMP recorded. Patient is postmenopausal.     Last Pap: 2024 was normal    She  reports never being sexually active.       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    ROS negative except as noted in HPI    Objective:  /62 (BP Location: Left arm, Patient Position: Sitting, Cuff Size: Standard)   Ht 5' 1\" (1.549 m)   Wt 51.3 kg (113 lb)   BMI 21.35 kg/m²      Physical Exam  Constitutional:       Appearance: Normal appearance.   Chest:   Breasts:     Right: Normal. No mass or tenderness.      Left: Normal. No mass or tenderness.   Abdominal:      Palpations: Abdomen is soft.      " Tenderness: There is no abdominal tenderness.   Genitourinary:     General: Normal vulva.      Vagina: No bleeding or lesions.      Cervix: Normal.      Uterus: Normal. Not tender.       Adnexa:         Right: No mass or tenderness.          Left: No mass or tenderness.        Rectum: No external hemorrhoid.      Comments: Atrophic changes appropriate to age; peds spec used and limited visualization of vagina and cervix due to patient discomfort; no blood or discharge noted    Musculoskeletal:         General: Normal range of motion.   Lymphadenopathy:      Upper Body:      Right upper body: No axillary adenopathy.      Left upper body: No axillary adenopathy.     Neurological:      Mental Status: She is alert and oriented to person, place, and time.     Psychiatric:         Mood and Affect: Mood normal.         Behavior: Behavior normal.             Bharti Gomes MD  2025  1:55 PM  Sign when Signing Visit  [facility] [place]  {[place] Locations:[address]}    Assessment & Plan  Encounter for annual routine gynecological examination  - Routine well woman exam completed today.  - Cervical Cancer Screening: Current ASCCP Guidelines reviewed. Last Pap: 2024 ***. Past abnormal pap: ***.  Next Pap Due: ***.  - Contraceptive counseling discussed.  Current contraception: {Contraception:85871}  - Breast Cancer Screening: Last Mammogram 2025, ***  - Colorectal cancer screening last ***, next due ***.  - Osteoporosis screening ***.  - The following were reviewed in today's visit: {Gyn counselin}       Encounter for screening mammogram for malignant neoplasm of breast    Orders:    Mammo screening bilateral w 3d and cad; Future    Extremely dense tissue of both breasts on mammography    Orders:    US breast screening bilateral complete (ABUS); Future      Next Exam: ***    I have spent a total time of *** minutes in caring for this patient on the day of the visit/encounter including {AMB  "Counseling Topics:[id number]}.    Subjective:     HPI: Suzanne Vasquez is a 60 y.o. who presents for ***.  Presents with Jennifer.       Periods stopped about 2 years ago.  No bleeding since.  Some hot flashes.   Not sexually active.      Sergey cornejo confirms no concerns.          The following portions of the patient's history were reviewed and updated as appropriate: allergies, current medications, past family history, past medical history, obstetric history, gynecologic history, past social history, past surgical history and problem list.    ROS: Review of Systems    Current Medications[3]    Objective:  /62 (BP Location: Left arm, Patient Position: Sitting, Cuff Size: Standard)   Ht 5' 1\" (1.549 m)   Wt 51.3 kg (113 lb)   BMI 21.35 kg/m²      Physical Exam             [1]  Social History  Tobacco Use    Smoking status: Never    Smokeless tobacco: Never   Vaping Use    Vaping status: Never Used   Substance Use Topics    Alcohol use: Never    Drug use: Never   [2]    Current Outpatient Medications:     acetaminophen (TYLENOL) 500 mg tablet, Take 500 mg by mouth every 6 (six) hours as needed for mild pain, Disp: , Rfl:     albuterol (ACCUNEB) 1.25 MG/3ML nebulizer solution, Take 1 ampule by nebulization every 6 (six) hours as needed for wheezing, Disp: , Rfl:     alendronate (FOSAMAX) 70 mg tablet, Take 70 mg by mouth every 7 days, Disp: , Rfl:     Carboxymethylcellul-Glycerin (Refresh Optive) 0.5-0.9 % SOLN, Apply to eye, Disp: , Rfl:     Cholecalciferol (VITAMIN D3) 5000 units CAPS, Take by mouth in the morning., Disp: , Rfl:     docusate sodium (COLACE) 100 mg capsule, Take 100 mg by mouth in the morning, Disp: , Rfl:     FLUoxetine (PROzac) 20 mg capsule, Take 20 mg by mouth in the morning., Disp: , Rfl:     FLUoxetine (PROzac) 40 MG capsule, Take 40 mg by mouth in the morning., Disp: , Rfl:     guaiFENesin (JERRI-TUSSIN PO), Take 10 mL by mouth if needed, Disp: , Rfl:     lisinopril (ZESTRIL) 10 mg " tablet, Take 10 mg by mouth in the morning., Disp: , Rfl:     montelukast (SINGULAIR) 10 mg tablet, Take 10 mg by mouth daily at bedtime, Disp: , Rfl:     simvastatin (ZOCOR) 40 mg tablet, Take 40 mg by mouth daily at bedtime, Disp: , Rfl:   [3]    Current Outpatient Medications:     acetaminophen (TYLENOL) 500 mg tablet, Take 500 mg by mouth every 6 (six) hours as needed for mild pain, Disp: , Rfl:     albuterol (ACCUNEB) 1.25 MG/3ML nebulizer solution, Take 1 ampule by nebulization every 6 (six) hours as needed for wheezing, Disp: , Rfl:     alendronate (FOSAMAX) 70 mg tablet, Take 70 mg by mouth every 7 days, Disp: , Rfl:     Carboxymethylcellul-Glycerin (Refresh Optive) 0.5-0.9 % SOLN, Apply to eye, Disp: , Rfl:     Cholecalciferol (VITAMIN D3) 5000 units CAPS, Take by mouth in the morning., Disp: , Rfl:     docusate sodium (COLACE) 100 mg capsule, Take 100 mg by mouth in the morning, Disp: , Rfl:     FLUoxetine (PROzac) 20 mg capsule, Take 20 mg by mouth in the morning., Disp: , Rfl:     FLUoxetine (PROzac) 40 MG capsule, Take 40 mg by mouth in the morning., Disp: , Rfl:     guaiFENesin (JERRI-TUSSIN PO), Take 10 mL by mouth if needed, Disp: , Rfl:     lisinopril (ZESTRIL) 10 mg tablet, Take 10 mg by mouth in the morning., Disp: , Rfl:     montelukast (SINGULAIR) 10 mg tablet, Take 10 mg by mouth daily at bedtime, Disp: , Rfl:     simvastatin (ZOCOR) 40 mg tablet, Take 40 mg by mouth daily at bedtime, Disp: , Rfl: